# Patient Record
Sex: FEMALE | Race: WHITE | Employment: OTHER | URBAN - METROPOLITAN AREA
[De-identification: names, ages, dates, MRNs, and addresses within clinical notes are randomized per-mention and may not be internally consistent; named-entity substitution may affect disease eponyms.]

---

## 2017-03-10 ENCOUNTER — APPOINTMENT (OUTPATIENT)
Dept: CT IMAGING | Facility: HOSPITAL | Age: 67
End: 2017-03-10
Attending: EMERGENCY MEDICINE
Payer: MEDICARE

## 2017-03-10 ENCOUNTER — HOSPITAL ENCOUNTER (EMERGENCY)
Facility: HOSPITAL | Age: 67
Discharge: HOME | End: 2017-03-10
Attending: EMERGENCY MEDICINE
Payer: MEDICARE

## 2017-03-10 VITALS
HEIGHT: 61 IN | DIASTOLIC BLOOD PRESSURE: 90 MMHG | BODY MASS INDEX: 52.87 KG/M2 | WEIGHT: 280 LBS | OXYGEN SATURATION: 98 % | HEART RATE: 78 BPM | SYSTOLIC BLOOD PRESSURE: 135 MMHG | TEMPERATURE: 99 F | RESPIRATION RATE: 22 BRPM

## 2017-03-10 DIAGNOSIS — S20.212A CHEST WALL CONTUSION, LEFT, INITIAL ENCOUNTER: Primary | ICD-10-CM

## 2017-03-10 LAB
ALBUMIN SERPL-MCNC: 4 G/DL (ref 3.5–4.8)
ALP LIVER SERPL-CCNC: 70 U/L (ref 55–142)
ALT SERPL-CCNC: 37 U/L (ref 14–54)
AST SERPL-CCNC: 27 U/L (ref 15–41)
BASOPHILS # BLD AUTO: 0.04 X10(3) UL (ref 0–0.1)
BASOPHILS NFR BLD AUTO: 0.5 %
BILIRUB SERPL-MCNC: 0.4 MG/DL (ref 0.1–2)
BUN BLD-MCNC: 12 MG/DL (ref 8–20)
CALCIUM BLD-MCNC: 9.5 MG/DL (ref 8.3–10.3)
CHLORIDE: 98 MMOL/L (ref 101–111)
CO2: 27 MMOL/L (ref 22–32)
CREAT BLD-MCNC: 0.93 MG/DL (ref 0.55–1.02)
EOSINOPHIL # BLD AUTO: 0.15 X10(3) UL (ref 0–0.3)
EOSINOPHIL NFR BLD AUTO: 1.8 %
ERYTHROCYTE [DISTWIDTH] IN BLOOD BY AUTOMATED COUNT: 13.4 % (ref 11.5–16)
GLUCOSE BLD-MCNC: 131 MG/DL (ref 70–99)
HCT VFR BLD AUTO: 36.2 % (ref 34–50)
HGB BLD-MCNC: 12.6 G/DL (ref 12–16)
IMMATURE GRANULOCYTE COUNT: 0.05 X10(3) UL (ref 0–1)
IMMATURE GRANULOCYTE RATIO %: 0.6 %
LYMPHOCYTES # BLD AUTO: 1.68 X10(3) UL (ref 0.9–4)
LYMPHOCYTES NFR BLD AUTO: 20.3 %
M PROTEIN MFR SERPL ELPH: 7.6 G/DL (ref 6.1–8.3)
MCH RBC QN AUTO: 29.9 PG (ref 27–33.2)
MCHC RBC AUTO-ENTMCNC: 34.8 G/DL (ref 31–37)
MCV RBC AUTO: 85.8 FL (ref 81–100)
MONOCYTES # BLD AUTO: 0.33 X10(3) UL (ref 0.1–0.6)
MONOCYTES NFR BLD AUTO: 4 %
NEUTROPHIL ABS PRELIM: 6.01 X10 (3) UL (ref 1.3–6.7)
NEUTROPHILS # BLD AUTO: 6.01 X10(3) UL (ref 1.3–6.7)
NEUTROPHILS NFR BLD AUTO: 72.8 %
PLATELET # BLD AUTO: 247 10(3)UL (ref 150–450)
POTASSIUM SERPL-SCNC: 4.5 MMOL/L (ref 3.6–5.1)
RBC # BLD AUTO: 4.22 X10(6)UL (ref 3.8–5.1)
RED CELL DISTRIBUTION WIDTH-SD: 42.1 FL (ref 35.1–46.3)
SODIUM SERPL-SCNC: 135 MMOL/L (ref 136–144)
WBC # BLD AUTO: 8.3 X10(3) UL (ref 4–13)

## 2017-03-10 PROCEDURE — 99284 EMERGENCY DEPT VISIT MOD MDM: CPT

## 2017-03-10 PROCEDURE — 36415 COLL VENOUS BLD VENIPUNCTURE: CPT

## 2017-03-10 PROCEDURE — 71250 CT THORAX DX C-: CPT

## 2017-03-10 PROCEDURE — 80053 COMPREHEN METABOLIC PANEL: CPT | Performed by: EMERGENCY MEDICINE

## 2017-03-10 PROCEDURE — 85025 COMPLETE CBC W/AUTO DIFF WBC: CPT | Performed by: EMERGENCY MEDICINE

## 2017-03-10 NOTE — ED INITIAL ASSESSMENT (HPI)
[place] [facility] [place] [facility] [place] a large left pleural effusion. Pt c/o SOB. Pt reports yesterday while traveling home from [place], pt missed a step and fell on her left side. Pt states he left side hurts denies loc.

## 2017-03-11 NOTE — ED PROVIDER NOTES
Patient Seen in: [facility] Emergency Department    History   Patient presents with:  Dyspnea DIPTI SOB (respiratory)    Stated Complaint:     HPI    66-year-old white female who presents the emergency room today for complaint of fall rib pain.   Pro escitalopram 10 MG Oral Tab,  Take 1 tablet (10 mg total) by mouth daily. Acetaminophen-Codeine (TYLENOL WITH CODEINE #3) 300-30 MG Oral Tab,  Take 1 tablet by mouth every 4 (four) hours as needed for Pain.    Cyclobenzaprine HCl (FLEXERIL) 10 MG Oral Tab Oral Tab,  1 TABLET DAILY- 2MG BID       No family history on file.       Smoking Status: Current Every Day Smoker        Packs/Day: 1.20  Years: 40        Types: Cigarettes    Smokeless Status: Never Used                        Alcohol Use: Yes           0 Abnormal; Notable for the following:     Glucose 131 (*)     Sodium 135 (*)     Chloride 98 (*)     All other components within normal limits   CBC WITH DIFFERENTIAL WITH PLATELET    Narrative:      The following orders were created for panel order CBC WITH lobe.     3. Findings possibly related to pulmonary arterial hypertension. Clinical correlation recommended.     4. Diffuse fatty infiltration of the liver.     5. Coronary artery calcifications.          MDM   Patient appears to have a chest wall contusion

## 2017-03-16 PROCEDURE — 99284 EMERGENCY DEPT VISIT MOD MDM: CPT

## 2017-03-16 PROCEDURE — 96372 THER/PROPH/DIAG INJ SC/IM: CPT

## 2017-03-17 ENCOUNTER — APPOINTMENT (OUTPATIENT)
Dept: GENERAL RADIOLOGY | Facility: HOSPITAL | Age: 67
End: 2017-03-17
Payer: MEDICARE

## 2017-03-17 ENCOUNTER — APPOINTMENT (OUTPATIENT)
Dept: CT IMAGING | Facility: HOSPITAL | Age: 67
End: 2017-03-17
Payer: MEDICARE

## 2017-03-17 ENCOUNTER — HOSPITAL ENCOUNTER (EMERGENCY)
Facility: HOSPITAL | Age: 67
Discharge: HOME | End: 2017-03-17
Payer: MEDICARE

## 2017-03-17 VITALS
HEART RATE: 75 BPM | BODY MASS INDEX: 53 KG/M2 | WEIGHT: 280 LBS | TEMPERATURE: 97 F | OXYGEN SATURATION: 93 % | SYSTOLIC BLOOD PRESSURE: 170 MMHG | DIASTOLIC BLOOD PRESSURE: 80 MMHG | RESPIRATION RATE: 18 BRPM

## 2017-03-17 DIAGNOSIS — S22.32XA CLOSED FRACTURE OF ONE RIB OF LEFT SIDE, INITIAL ENCOUNTER: Primary | ICD-10-CM

## 2017-03-17 DIAGNOSIS — Z72.0 TOBACCO ABUSE: ICD-10-CM

## 2017-03-17 DIAGNOSIS — E66.01 MORBID OBESITY, UNSPECIFIED OBESITY TYPE (HCC): ICD-10-CM

## 2017-03-17 PROCEDURE — 74150 CT ABDOMEN W/O CONTRAST: CPT

## 2017-03-17 PROCEDURE — 71250 CT THORAX DX C-: CPT

## 2017-03-17 RX ORDER — HYDROMORPHONE HYDROCHLORIDE 1 MG/ML
1 INJECTION, SOLUTION INTRAMUSCULAR; INTRAVENOUS; SUBCUTANEOUS ONCE
Status: COMPLETED | OUTPATIENT
Start: 2017-03-17 | End: 2017-03-17

## 2017-03-17 RX ORDER — ACETAMINOPHEN AND CODEINE PHOSPHATE 300; 30 MG/1; MG/1
1 TABLET ORAL ONCE
Status: COMPLETED | OUTPATIENT
Start: 2017-03-17 | End: 2017-03-17

## 2017-03-17 RX ORDER — ONDANSETRON 4 MG/1
4 TABLET, ORALLY DISINTEGRATING ORAL ONCE
Status: COMPLETED | OUTPATIENT
Start: 2017-03-17 | End: 2017-03-17

## 2017-03-17 NOTE — ED INITIAL ASSESSMENT (HPI)
Presents with left rib cage pain. Pt had a fall last Friday and was seen in this ED. CT imaging completed.  Pain increased over last 2 days, taking Tramadol at home without relief

## 2017-03-17 NOTE — ED PROVIDER NOTES
Patient Seen in: [facility] Emergency Department    History   Patient presents with:  Pain (neurologic)    Stated Complaint: pain    HPI    Patient is a 71-year-old morbidly obese chronic smoker with Crohn's diverticulitis and arthritis presenting t • Arthritis    • Anemia      Borderline   • HIGH BLOOD PRESSURE    • HIGH CHOLESTEROL    • STROKE 1994      TIA   • Liver disease      fatty liver   • Esophageal reflux    • PONV (postoperative nausea and vomiting)    • Anxiety    • TIA (transient ischem DIPHENOXYLATE-ATROPINE 2.5-0.025 MG Oral Tab,  TAKE 1 TABLET BY MOUTH THREE TIMES DAILY AS NEEDED   Ondansetron HCl (ZOFRAN) 8 MG Oral Tab,  Take 1 tablet by mouth every 8 (eight) hours as needed for Nausea.    Acidophilus/Pectin (PROBIOTIC) Oral Cap,  Take 127 kg  SpO2 95%        Physical Exam    GENERAL APPEARANCE:     Well developed, well nourished, alert     Morbidly obese female in no apparent distress, sitting at the side of the bed, her  at the bedside. Head: Normocephalic and atraumatic.      Mariana Hussein with hypertension. No mediastinal hematoma. No pleural effusion, pneumothorax or pneumomediastinum. The lungs demonstrate minimal dependent atelectasis. ABDOMEN:    No free intraperitoneal fluid or gas. The pelvis was not imaged.   Nondisplaced left late repeat exam, workup as needed, for pain management      Medications Prescribed:  Current Discharge Medication List

## 2017-03-17 NOTE — ED NOTES
Pt instructed on incentive spirometer use, Pt with good return demonstration and verbal understanding

## 2017-06-07 ENCOUNTER — OFFICE VISIT (OUTPATIENT)
Dept: RHEUMATOLOGY | Facility: CLINIC | Age: 67
End: 2017-06-07

## 2017-06-07 VITALS
BODY MASS INDEX: 51.99 KG/M2 | HEIGHT: 61 IN | RESPIRATION RATE: 22 BRPM | WEIGHT: 275.38 LBS | DIASTOLIC BLOOD PRESSURE: 72 MMHG | HEART RATE: 88 BPM | SYSTOLIC BLOOD PRESSURE: 136 MMHG

## 2017-06-07 DIAGNOSIS — M25.511 CHRONIC PAIN OF BOTH SHOULDERS: ICD-10-CM

## 2017-06-07 DIAGNOSIS — K63.9 ARTHRITIS ASSOCIATED WITH INFLAMMATORY BOWEL DISEASE: ICD-10-CM

## 2017-06-07 DIAGNOSIS — M75.51 BURSITIS OF RIGHT SHOULDER: ICD-10-CM

## 2017-06-07 DIAGNOSIS — G89.29 CHRONIC PAIN OF BOTH SHOULDERS: ICD-10-CM

## 2017-06-07 DIAGNOSIS — K50.10 CROHN'S COLITIS, WITHOUT COMPLICATIONS (HCC): Primary | ICD-10-CM

## 2017-06-07 DIAGNOSIS — M25.511 RIGHT SHOULDER PAIN, UNSPECIFIED CHRONICITY: ICD-10-CM

## 2017-06-07 DIAGNOSIS — M25.512 CHRONIC PAIN OF BOTH SHOULDERS: ICD-10-CM

## 2017-06-07 DIAGNOSIS — M07.60 ARTHRITIS ASSOCIATED WITH INFLAMMATORY BOWEL DISEASE: ICD-10-CM

## 2017-06-07 PROCEDURE — 99213 OFFICE O/P EST LOW 20 MIN: CPT | Performed by: INTERNAL MEDICINE

## 2017-06-07 PROCEDURE — 20610 DRAIN/INJ JOINT/BURSA W/O US: CPT | Performed by: INTERNAL MEDICINE

## 2017-06-07 RX ORDER — ACETAMINOPHEN AND CODEINE PHOSPHATE 300; 30 MG/1; MG/1
1 TABLET ORAL EVERY 4 HOURS PRN
Qty: 100 TABLET | Refills: 1 | Status: SHIPPED | OUTPATIENT
Start: 2017-06-07 | End: 2017-11-01

## 2017-06-07 RX ORDER — METHYLPREDNISOLONE ACETATE 40 MG/ML
40 INJECTION, SUSPENSION INTRA-ARTICULAR; INTRALESIONAL; INTRAMUSCULAR; SOFT TISSUE ONCE
Status: COMPLETED | OUTPATIENT
Start: 2017-06-07 | End: 2017-06-07

## 2017-06-07 RX ORDER — TRAMADOL HYDROCHLORIDE 50 MG/1
50 TABLET ORAL EVERY 6 HOURS PRN
Qty: 100 TABLET | Refills: 2 | Status: SHIPPED | OUTPATIENT
Start: 2017-06-07 | End: 2017-11-01

## 2017-06-07 RX ORDER — CYCLOBENZAPRINE HCL 10 MG
10 TABLET ORAL 3 TIMES DAILY PRN
Qty: 90 TABLET | Refills: 2 | Status: SHIPPED | OUTPATIENT
Start: 2017-06-07 | End: 2017-11-01

## 2017-06-07 RX ORDER — LIDOCAINE 50 MG/G
2 PATCH TOPICAL EVERY 24 HOURS
Qty: 60 PATCH | Refills: 3 | Status: SHIPPED | OUTPATIENT
Start: 2017-06-07 | End: 2017-11-01

## 2017-06-07 RX ORDER — PREDNISONE 1 MG/1
5 TABLET ORAL DAILY
Qty: 90 TABLET | Refills: 3 | Status: SHIPPED | OUTPATIENT
Start: 2017-06-07 | End: 2017-11-01

## 2017-06-07 RX ADMIN — METHYLPREDNISOLONE ACETATE 40 MG: 40 INJECTION, SUSPENSION INTRA-ARTICULAR; INTRALESIONAL; INTRAMUSCULAR; SOFT TISSUE at 09:42:00

## 2017-06-07 NOTE — PROCEDURES
After obtaining consent from the patient, the right shoulder was cleansed with betadine and alcohol wipes. Then the right shoulder was injected with 40 mg of redness alone and 1 cc 1% lidocaine. Patient tolerated the injection well.

## 2017-06-07 NOTE — PROGRESS NOTES
[facility]  Dr. Latoya Lovell Progress Note     Subjective:   Gwen Sepulveda is a(n) 79year old female.    Current complaints: Patient presents with:  Joint Pain: pt is here for a 6 month f/up- pt c/o pain 5/10 her left hand and right shoulders  Yesterday f Derm patch. For pain during the day take tramadol 50 mg 1 or 2 at a time as needed. At nighttime if there is severe pain take Tylenol 3. Continue your Humira and Asacol for your Crohn's disease. Return to see Dr. Alisha Argueta in 6 months.      See procedure n

## 2017-06-07 NOTE — PATIENT INSTRUCTIONS
Current instructions are to use prednisone 5 mg once a day in most cases.   However when there is severe aches and pains or flareup of the arthritis, then take extra prednisone sometimes she will need to tablets which which would be 10 mg and on rare occasi

## 2017-11-01 ENCOUNTER — OFFICE VISIT (OUTPATIENT)
Dept: RHEUMATOLOGY | Facility: CLINIC | Age: 67
End: 2017-11-01

## 2017-11-01 VITALS
WEIGHT: 270 LBS | RESPIRATION RATE: 24 BRPM | HEART RATE: 68 BPM | SYSTOLIC BLOOD PRESSURE: 116 MMHG | BODY MASS INDEX: 51 KG/M2 | DIASTOLIC BLOOD PRESSURE: 78 MMHG

## 2017-11-01 DIAGNOSIS — IMO0001 CLASS 3 OBESITY DUE TO EXCESS CALORIES WITHOUT SERIOUS COMORBIDITY WITH BODY MASS INDEX (BMI) OF 50.0 TO 59.9 IN ADULT: ICD-10-CM

## 2017-11-01 DIAGNOSIS — K63.9 ARTHRITIS ASSOCIATED WITH INFLAMMATORY BOWEL DISEASE: Primary | ICD-10-CM

## 2017-11-01 DIAGNOSIS — M07.60 ARTHRITIS ASSOCIATED WITH INFLAMMATORY BOWEL DISEASE: Primary | ICD-10-CM

## 2017-11-01 PROCEDURE — 99213 OFFICE O/P EST LOW 20 MIN: CPT | Performed by: INTERNAL MEDICINE

## 2017-11-01 RX ORDER — ACETAMINOPHEN AND CODEINE PHOSPHATE 300; 30 MG/1; MG/1
1 TABLET ORAL EVERY 4 HOURS PRN
Qty: 100 TABLET | Refills: 3 | Status: SHIPPED | OUTPATIENT
Start: 2017-11-01 | End: 2018-05-10

## 2017-11-01 RX ORDER — PREDNISONE 1 MG/1
5 TABLET ORAL DAILY
Qty: 100 TABLET | Refills: 3 | Status: SHIPPED | OUTPATIENT
Start: 2017-11-01 | End: 2018-05-10

## 2017-11-01 RX ORDER — TRAMADOL HYDROCHLORIDE 50 MG/1
50 TABLET ORAL EVERY 6 HOURS PRN
Qty: 100 TABLET | Refills: 5 | Status: SHIPPED | OUTPATIENT
Start: 2017-11-01 | End: 2018-05-10

## 2017-11-01 RX ORDER — LIDOCAINE 50 MG/G
2 PATCH TOPICAL EVERY 24 HOURS
Qty: 60 PATCH | Refills: 3 | Status: SHIPPED | OUTPATIENT
Start: 2017-11-01 | End: 2019-05-08

## 2017-11-01 RX ORDER — CYCLOBENZAPRINE HCL 10 MG
10 TABLET ORAL 3 TIMES DAILY PRN
Qty: 90 TABLET | Refills: 2 | Status: SHIPPED | OUTPATIENT
Start: 2017-11-01 | End: 2018-01-30

## 2017-11-01 NOTE — PROGRESS NOTES
[facility]  Dr. Lupillo Barrera Progress Note     Subjective:   Marjan Galvan is a(n) 79year old female. Current complaints: Patient presents with:  Arthritis: 6 month f/u.  Pt states 'right knuckles and hand swell up and is having a hard time making a fis

## 2017-11-01 NOTE — PATIENT INSTRUCTIONS
Current plan is to take prednisone generally 5 mg once a day but on bad days you can take a second tablet. As far as pain medicine use tramadol 50 mg generally 2 per day. At nighttime if there is severe pain take Tylenol 3.   Continue on your Humira and A

## 2018-01-30 RX ORDER — CYCLOBENZAPRINE HCL 10 MG
TABLET ORAL
Qty: 90 TABLET | Refills: 0 | Status: SHIPPED | OUTPATIENT
Start: 2018-01-30 | End: 2018-05-10

## 2018-01-30 NOTE — TELEPHONE ENCOUNTER
LOV 11/1/17  Future Appointments  Date Time Provider Vicenta Burnett   8/98/2033 42:72 AM Asha Paulino MD [facility] Cheri Campbell

## 2018-05-02 ENCOUNTER — TELEPHONE (OUTPATIENT)
Dept: RHEUMATOLOGY | Facility: CLINIC | Age: 68
End: 2018-05-02

## 2018-05-02 DIAGNOSIS — K63.9 ARTHROPATHY ASSOCIATED WITH GASTROINTESTINAL CONDITION: Primary | ICD-10-CM

## 2018-05-02 DIAGNOSIS — K63.9 DISEASE OF INTESTINE: ICD-10-CM

## 2018-05-02 DIAGNOSIS — M07.60 ARTHROPATHY ASSOCIATED WITH GASTROINTESTINAL CONDITION: Primary | ICD-10-CM

## 2018-05-10 ENCOUNTER — OFFICE VISIT (OUTPATIENT)
Dept: RHEUMATOLOGY | Facility: CLINIC | Age: 68
End: 2018-05-10

## 2018-05-10 VITALS
BODY MASS INDEX: 48 KG/M2 | RESPIRATION RATE: 24 BRPM | WEIGHT: 259.81 LBS | SYSTOLIC BLOOD PRESSURE: 118 MMHG | HEART RATE: 84 BPM | DIASTOLIC BLOOD PRESSURE: 78 MMHG

## 2018-05-10 DIAGNOSIS — K63.9 ARTHRITIS ASSOCIATED WITH INFLAMMATORY BOWEL DISEASE: Primary | ICD-10-CM

## 2018-05-10 DIAGNOSIS — M07.60 ARTHRITIS ASSOCIATED WITH INFLAMMATORY BOWEL DISEASE: Primary | ICD-10-CM

## 2018-05-10 DIAGNOSIS — M25.512 CHRONIC PAIN OF BOTH SHOULDERS: ICD-10-CM

## 2018-05-10 DIAGNOSIS — G89.29 CHRONIC PAIN OF BOTH SHOULDERS: ICD-10-CM

## 2018-05-10 DIAGNOSIS — E66.01 CLASS 3 SEVERE OBESITY DUE TO EXCESS CALORIES WITHOUT SERIOUS COMORBIDITY WITH BODY MASS INDEX (BMI) OF 50.0 TO 59.9 IN ADULT (HCC): ICD-10-CM

## 2018-05-10 DIAGNOSIS — K50.10 CROHN'S DISEASE OF COLON WITHOUT COMPLICATION (HCC): ICD-10-CM

## 2018-05-10 DIAGNOSIS — M25.511 CHRONIC PAIN OF BOTH SHOULDERS: ICD-10-CM

## 2018-05-10 PROCEDURE — 99213 OFFICE O/P EST LOW 20 MIN: CPT | Performed by: INTERNAL MEDICINE

## 2018-05-10 RX ORDER — ACETAMINOPHEN AND CODEINE PHOSPHATE 300; 30 MG/1; MG/1
1 TABLET ORAL EVERY 4 HOURS PRN
Qty: 100 TABLET | Refills: 3 | Status: SHIPPED | OUTPATIENT
Start: 2018-05-10 | End: 2018-11-07

## 2018-05-10 RX ORDER — TRAMADOL HYDROCHLORIDE 50 MG/1
50 TABLET ORAL EVERY 6 HOURS PRN
Qty: 100 TABLET | Refills: 5 | Status: SHIPPED | OUTPATIENT
Start: 2018-05-10 | End: 2018-11-07

## 2018-05-10 RX ORDER — CYCLOBENZAPRINE HCL 10 MG
TABLET ORAL
Qty: 90 TABLET | Refills: 1 | Status: SHIPPED | OUTPATIENT
Start: 2018-05-10 | End: 2018-11-07

## 2018-05-10 RX ORDER — PREDNISONE 1 MG/1
5 TABLET ORAL DAILY
Qty: 100 TABLET | Refills: 3 | Status: SHIPPED | OUTPATIENT
Start: 2018-05-10 | End: 2018-11-07

## 2018-05-10 NOTE — PROGRESS NOTES
[facility]  Dr. Nestor Medellin Progress Note     Subjective:   Sourav Neri is a(n) 76year old female. Current complaints: Patient presents with:  Arthritis: 6 month f/u. Pt states 'has a bump right index finger, bump will go on to different fingers. ' 6.5.  Continue to take vitamin B12 supplement because your recent vitamin B12 level while normal was in the lower range of normal.  Return to see Dr. Judah Ramos in 6 months.        Basim Lewis MD 3/21/9611 10:45 AM

## 2018-05-10 NOTE — PATIENT INSTRUCTIONS
Current plan is to continue on prednisone 5 mg once a day for inflammatory arthritis. Continue on your Humira and Asacol for your Crohn's disease and see Dr. Darleen Musa of GI with on a regular basis. Tylenol 3 1 or 2 pills at night for sleep.   During the

## 2018-11-07 ENCOUNTER — OFFICE VISIT (OUTPATIENT)
Dept: RHEUMATOLOGY | Facility: CLINIC | Age: 68
End: 2018-11-07
Payer: MEDICARE

## 2018-11-07 VITALS
SYSTOLIC BLOOD PRESSURE: 118 MMHG | HEIGHT: 63 IN | BODY MASS INDEX: 47.48 KG/M2 | DIASTOLIC BLOOD PRESSURE: 72 MMHG | WEIGHT: 268 LBS | RESPIRATION RATE: 20 BRPM | HEART RATE: 72 BPM

## 2018-11-07 DIAGNOSIS — M25.512 CHRONIC PAIN OF BOTH SHOULDERS: ICD-10-CM

## 2018-11-07 DIAGNOSIS — M07.60 ARTHRITIS ASSOCIATED WITH INFLAMMATORY BOWEL DISEASE: Primary | ICD-10-CM

## 2018-11-07 DIAGNOSIS — K63.9 ARTHRITIS ASSOCIATED WITH INFLAMMATORY BOWEL DISEASE: Primary | ICD-10-CM

## 2018-11-07 DIAGNOSIS — K50.10 CROHN'S DISEASE OF COLON WITHOUT COMPLICATION (HCC): ICD-10-CM

## 2018-11-07 DIAGNOSIS — M25.511 CHRONIC PAIN OF BOTH SHOULDERS: ICD-10-CM

## 2018-11-07 DIAGNOSIS — G89.29 CHRONIC PAIN OF BOTH SHOULDERS: ICD-10-CM

## 2018-11-07 DIAGNOSIS — E66.01 CLASS 3 SEVERE OBESITY DUE TO EXCESS CALORIES WITHOUT SERIOUS COMORBIDITY WITH BODY MASS INDEX (BMI) OF 50.0 TO 59.9 IN ADULT (HCC): ICD-10-CM

## 2018-11-07 PROCEDURE — 20610 DRAIN/INJ JOINT/BURSA W/O US: CPT | Performed by: INTERNAL MEDICINE

## 2018-11-07 RX ORDER — ACETAMINOPHEN AND CODEINE PHOSPHATE 300; 30 MG/1; MG/1
1 TABLET ORAL EVERY 6 HOURS PRN
Qty: 100 TABLET | Refills: 3 | Status: SHIPPED | OUTPATIENT
Start: 2018-11-07 | End: 2019-05-08

## 2018-11-07 RX ORDER — CYCLOBENZAPRINE HCL 10 MG
TABLET ORAL
Qty: 90 TABLET | Refills: 2 | Status: SHIPPED | OUTPATIENT
Start: 2018-11-07 | End: 2019-05-08

## 2018-11-07 RX ORDER — TRAMADOL HYDROCHLORIDE 50 MG/1
50 TABLET ORAL EVERY 6 HOURS PRN
Qty: 100 TABLET | Refills: 5 | Status: SHIPPED | OUTPATIENT
Start: 2018-11-07 | End: 2019-05-08

## 2018-11-07 RX ORDER — METHYLPREDNISOLONE ACETATE 40 MG/ML
40 INJECTION, SUSPENSION INTRA-ARTICULAR; INTRALESIONAL; INTRAMUSCULAR; SOFT TISSUE ONCE
Status: COMPLETED | OUTPATIENT
Start: 2018-11-07 | End: 2018-11-07

## 2018-11-07 RX ORDER — PREDNISONE 1 MG/1
5 TABLET ORAL DAILY
Qty: 100 TABLET | Refills: 3 | Status: SHIPPED | OUTPATIENT
Start: 2018-11-07 | End: 2019-05-08

## 2018-11-07 RX ADMIN — METHYLPREDNISOLONE ACETATE 40 MG: 40 INJECTION, SUSPENSION INTRA-ARTICULAR; INTRALESIONAL; INTRAMUSCULAR; SOFT TISSUE at 13:34:00

## 2018-11-07 RX ADMIN — METHYLPREDNISOLONE ACETATE 40 MG: 40 INJECTION, SUSPENSION INTRA-ARTICULAR; INTRALESIONAL; INTRAMUSCULAR; SOFT TISSUE at 13:33:00

## 2018-11-07 NOTE — PROGRESS NOTES
[facility]  Dr. Charles De Leon Progress Note     Subjective:   Cesar Wild is a(n) 76year old female. Current complaints: Patient presents with:  Osteoarthritis: 6 month follow-uo, has been struggling with joint pain, back, hands and toes.  No recent la

## 2018-11-07 NOTE — PROCEDURES
After obtaining consent from the patient, both shoulders were cleaned with a combination of Betadine and alcohol wipes. Then from the anterior approach both shoulders were injected.   40 mg of methylprednisolone and 1 cc 1% lidocaine was injected in each

## 2018-11-07 NOTE — PATIENT INSTRUCTIONS
Plan as far as treatment is continue on prednisone 5 mg once a day for inflammatory arthritis. Continue your treatment with Dr. Merry Pathak of GI for your Crohn's disease which includes Humira and Asacol.   During the day take tramadol 50 mg 1 or 2 as needed f

## 2019-05-08 ENCOUNTER — OFFICE VISIT (OUTPATIENT)
Dept: RHEUMATOLOGY | Facility: CLINIC | Age: 69
End: 2019-05-08
Payer: MEDICARE

## 2019-05-08 VITALS
WEIGHT: 264 LBS | DIASTOLIC BLOOD PRESSURE: 68 MMHG | HEART RATE: 80 BPM | SYSTOLIC BLOOD PRESSURE: 158 MMHG | HEIGHT: 62 IN | BODY MASS INDEX: 48.58 KG/M2

## 2019-05-08 DIAGNOSIS — K50.10 CROHN'S DISEASE OF COLON WITHOUT COMPLICATION (HCC): ICD-10-CM

## 2019-05-08 DIAGNOSIS — K63.9 ARTHRITIS ASSOCIATED WITH INFLAMMATORY BOWEL DISEASE: Primary | ICD-10-CM

## 2019-05-08 DIAGNOSIS — M25.511 CHRONIC PAIN OF BOTH SHOULDERS: ICD-10-CM

## 2019-05-08 DIAGNOSIS — G89.29 CHRONIC PAIN OF BOTH SHOULDERS: ICD-10-CM

## 2019-05-08 DIAGNOSIS — M25.512 CHRONIC PAIN OF BOTH SHOULDERS: ICD-10-CM

## 2019-05-08 DIAGNOSIS — E66.01 CLASS 3 SEVERE OBESITY DUE TO EXCESS CALORIES WITHOUT SERIOUS COMORBIDITY WITH BODY MASS INDEX (BMI) OF 50.0 TO 59.9 IN ADULT (HCC): ICD-10-CM

## 2019-05-08 DIAGNOSIS — M07.60 ARTHRITIS ASSOCIATED WITH INFLAMMATORY BOWEL DISEASE: Primary | ICD-10-CM

## 2019-05-08 PROCEDURE — 99213 OFFICE O/P EST LOW 20 MIN: CPT | Performed by: INTERNAL MEDICINE

## 2019-05-08 RX ORDER — CYCLOBENZAPRINE HCL 10 MG
TABLET ORAL
Qty: 90 TABLET | Refills: 2 | Status: SHIPPED | OUTPATIENT
Start: 2019-05-08 | End: 2020-07-01

## 2019-05-08 RX ORDER — LIDOCAINE 50 MG/G
2 PATCH TOPICAL EVERY 24 HOURS
Qty: 60 PATCH | Refills: 3 | Status: SHIPPED | OUTPATIENT
Start: 2019-05-08 | End: 2020-07-01

## 2019-05-08 RX ORDER — TRAMADOL HYDROCHLORIDE 50 MG/1
TABLET ORAL EVERY 6 HOURS PRN
Qty: 100 TABLET | Refills: 5 | Status: SHIPPED | OUTPATIENT
Start: 2019-05-08 | End: 2019-10-30

## 2019-05-08 RX ORDER — PREDNISONE 1 MG/1
5 TABLET ORAL 2 TIMES DAILY PRN
Qty: 120 TABLET | Refills: 3 | Status: SHIPPED | OUTPATIENT
Start: 2019-05-08 | End: 2019-10-30

## 2019-05-08 RX ORDER — ACETAMINOPHEN AND CODEINE PHOSPHATE 300; 30 MG/1; MG/1
1 TABLET ORAL EVERY 6 HOURS PRN
Qty: 100 TABLET | Refills: 3 | Status: SHIPPED | OUTPATIENT
Start: 2019-05-08 | End: 2019-10-30

## 2019-05-08 NOTE — PROGRESS NOTES
[facility]  Dr. Linnea Pedraza Progress Note     Subjective:   Piter Bustos is a(n) 71year old female. Current complaints: Patient presents with: Follow - Up: 6 adilia f/u, Arthritis asociated with inflammatory bowel disease, Pain contract 11/12/18.  No r

## 2019-05-08 NOTE — PATIENT INSTRUCTIONS
Use Prednisone 5 mg per day for arthritis pain, occasionally a rare 2nd pill. Use Tramadol for pain 50 mg 1-2 at a time. For pain use Tylenol#3, 1-2 as needed for pain up to 2 per day. Use Humira for Crohn's.   See Dr Martine Melendez of Orthopedics regarding yo

## 2019-07-03 ENCOUNTER — HOSPITAL ENCOUNTER (OUTPATIENT)
Facility: HOSPITAL | Age: 69
Setting detail: HOSPITAL OUTPATIENT SURGERY
Discharge: HOME | End: 2019-07-03
Attending: INTERNAL MEDICINE | Admitting: INTERNAL MEDICINE
Payer: MEDICARE

## 2019-07-03 ENCOUNTER — ANESTHESIA EVENT (OUTPATIENT)
Dept: ENDOSCOPY | Facility: HOSPITAL | Age: 69
End: 2019-07-03
Payer: MEDICARE

## 2019-07-03 ENCOUNTER — ANESTHESIA (OUTPATIENT)
Dept: ENDOSCOPY | Facility: HOSPITAL | Age: 69
End: 2019-07-03
Payer: MEDICARE

## 2019-07-03 VITALS
BODY MASS INDEX: 48.21 KG/M2 | RESPIRATION RATE: 18 BRPM | WEIGHT: 262 LBS | OXYGEN SATURATION: 98 % | SYSTOLIC BLOOD PRESSURE: 126 MMHG | HEART RATE: 68 BPM | DIASTOLIC BLOOD PRESSURE: 61 MMHG | HEIGHT: 62 IN | TEMPERATURE: 98 F

## 2019-07-03 DIAGNOSIS — K50.10 CROHN'S DISEASE OF COLON WITHOUT COMPLICATION (HCC): ICD-10-CM

## 2019-07-03 LAB — GLUCOSE BLD-MCNC: 176 MG/DL (ref 70–99)

## 2019-07-03 PROCEDURE — 0DJD8ZZ INSPECTION OF LOWER INTESTINAL TRACT, VIA NATURAL OR ARTIFICIAL OPENING ENDOSCOPIC: ICD-10-PCS | Performed by: INTERNAL MEDICINE

## 2019-07-03 PROCEDURE — 82962 GLUCOSE BLOOD TEST: CPT

## 2019-07-03 PROCEDURE — 88305 TISSUE EXAM BY PATHOLOGIST: CPT | Performed by: INTERNAL MEDICINE

## 2019-07-03 RX ORDER — DEXTROSE MONOHYDRATE 25 G/50ML
50 INJECTION, SOLUTION INTRAVENOUS
Status: DISCONTINUED | OUTPATIENT
Start: 2019-07-03 | End: 2019-07-03

## 2019-07-03 RX ORDER — NALOXONE HYDROCHLORIDE 0.4 MG/ML
80 INJECTION, SOLUTION INTRAMUSCULAR; INTRAVENOUS; SUBCUTANEOUS AS NEEDED
Status: DISCONTINUED | OUTPATIENT
Start: 2019-07-03 | End: 2019-07-03

## 2019-07-03 RX ORDER — SODIUM CHLORIDE, SODIUM LACTATE, POTASSIUM CHLORIDE, CALCIUM CHLORIDE 600; 310; 30; 20 MG/100ML; MG/100ML; MG/100ML; MG/100ML
INJECTION, SOLUTION INTRAVENOUS CONTINUOUS
Status: DISCONTINUED | OUTPATIENT
Start: 2019-07-03 | End: 2019-07-03

## 2019-07-03 NOTE — OPERATIVE REPORT
[facility]    PATIENT'S NAME: Ira Montague   ATTENDING PHYSICIAN: Aiden Heaton M.D. OPERATING PHYSICIAN: Aiden Heaton M.D.    PATIENT ACCOUNT#:   [de-identified]    LOCATION:  [facility] ROOMS 9 EDWP  MEDICAL RECORD #:   [id number]

## 2019-07-03 NOTE — ANESTHESIA PREPROCEDURE EVALUATION
PRE-OP EVALUATION    Patient Name: Haley Iniguez    Pre-op Diagnosis: Crohn's disease of colon without complication ([facility].) [T08.12]    Procedure(s):  COLONOSCOPY with cold forcep biopsies    Surgeon(s) and Role:     * To Martínez MD - Primary    Pre Pen-injector Kit Inject 40 mg into the skin every 14 (fourteen) days.  CITRATE FREE Schedule annual MD visit before next refill request. Disp: 2 each Rfl: 7   Dexlansoprazole (DEXILANT) 60 MG Oral Capsule Delayed Release TAKE 1 CAPSULE ONCE DAILY Disp: 90 c (DAILY VITAMIN OR) None Entered Disp:  Rfl:        Allergies: Metrizamide; Bactrim [Sulfamethoxazole W/Trimethoprim]; Bupropion; Cephalosporins; Ciprofloxacin; Clarithromycin [Clarithromycin]; Keflex [Cephalexin]; Levaquin [Levofloxacin]; Penicillins;  Radi and patient.       Plan/risks discussed with: patient                Present on Admission:  **None**

## 2019-07-03 NOTE — ANESTHESIA POSTPROCEDURE EVALUATION
[address] Patient Status:  Hospital Outpatient Surgery   Age/Gender 71year old female MRN [id number]   Location [facility]. Attending Martine Bowles MD   Hosp Day # 0 PCP CHELLE FERNANDES       Anesthesia Post-op

## 2019-07-03 NOTE — H&P
[facility]. Group Department of  Gastroenterology  Update Health History :       Saturnino Sanders  female   Tiara Matt MD     [id number]  1/18/1950 Primary Care Physician  Riddhi Truong        HPI :  Lo Comment:Strange thoughts  Cephalosporins              Comment:Itchy throat  Ciprofloxacin           RASH  Clarithromycin [Cla*        Comment:Unknown reaction  Keflex [Cephalexin]     HIVES  Levaquin [Levofloxa*    NAUSEA AND VOMITING  Penicillins 5 MG Oral Tab Take 5 mg by mouth daily. Disp:  Rfl:    Albuterol Sulfate HFA (PROAIR HFA) 108 (90 BASE) MCG/ACT Inhalation Aero Soln Inhale 2 puffs into the lungs every 6 (six) hours as needed.  Disp:  Rfl:    magnesium 250 MG Oral Tab Take 500 mg by mout intact    Plan:   colonoscopy  The risks and benefits of the procedure were discussed in detail with the patient, alternatives and options were all discussed, all questions were answered, patient verbalized understanding and agreed to proceed with procedur

## 2019-07-03 NOTE — BRIEF OP NOTE
Pre-Operative Diagnosis: Crohn's disease of colon without complication ([facility] [N13.55]     Post-Operative Diagnosis: * No post-op diagnosis entered *      Procedure Performed:   Procedure(s):  COLONOSCOPY with cold forcep biopsies    Surgeon(s) and Role:

## 2019-07-12 NOTE — PROGRESS NOTES
7/12/2019  [address] [place] [id number]    Dear Julia Reynolds,       Here are the  biopsy/pathology findings from your recent Colonoscopy :    a normal biopsy.       Follow-up information:    Biopsies ok repeat colonoscopy in 2 year

## 2019-10-30 ENCOUNTER — TELEPHONE (OUTPATIENT)
Dept: RHEUMATOLOGY | Facility: CLINIC | Age: 69
End: 2019-10-30

## 2019-10-30 ENCOUNTER — OFFICE VISIT (OUTPATIENT)
Dept: RHEUMATOLOGY | Facility: CLINIC | Age: 69
End: 2019-10-30
Payer: MEDICARE

## 2019-10-30 VITALS
RESPIRATION RATE: 20 BRPM | HEART RATE: 68 BPM | BODY MASS INDEX: 48.21 KG/M2 | WEIGHT: 262 LBS | DIASTOLIC BLOOD PRESSURE: 68 MMHG | SYSTOLIC BLOOD PRESSURE: 126 MMHG | HEIGHT: 62 IN

## 2019-10-30 DIAGNOSIS — G89.29 CHRONIC PAIN OF BOTH SHOULDERS: ICD-10-CM

## 2019-10-30 DIAGNOSIS — M19.011 PRIMARY OSTEOARTHRITIS OF BOTH SHOULDERS: ICD-10-CM

## 2019-10-30 DIAGNOSIS — K63.9 ARTHRITIS ASSOCIATED WITH INFLAMMATORY BOWEL DISEASE: Primary | ICD-10-CM

## 2019-10-30 DIAGNOSIS — M19.012 PRIMARY OSTEOARTHRITIS OF BOTH SHOULDERS: ICD-10-CM

## 2019-10-30 DIAGNOSIS — E66.01 CLASS 3 SEVERE OBESITY DUE TO EXCESS CALORIES WITHOUT SERIOUS COMORBIDITY WITH BODY MASS INDEX (BMI) OF 50.0 TO 59.9 IN ADULT (HCC): ICD-10-CM

## 2019-10-30 DIAGNOSIS — E11.9 TYPE 2 DIABETES MELLITUS WITHOUT COMPLICATION, WITHOUT LONG-TERM CURRENT USE OF INSULIN (HCC): ICD-10-CM

## 2019-10-30 DIAGNOSIS — M25.511 CHRONIC PAIN OF BOTH SHOULDERS: ICD-10-CM

## 2019-10-30 DIAGNOSIS — M25.512 CHRONIC PAIN OF BOTH SHOULDERS: ICD-10-CM

## 2019-10-30 DIAGNOSIS — M07.60 ARTHRITIS ASSOCIATED WITH INFLAMMATORY BOWEL DISEASE: Primary | ICD-10-CM

## 2019-10-30 DIAGNOSIS — M19.011 PRIMARY OSTEOARTHRITIS OF RIGHT SHOULDER: ICD-10-CM

## 2019-10-30 DIAGNOSIS — M19.012 PRIMARY OSTEOARTHRITIS OF LEFT SHOULDER: ICD-10-CM

## 2019-10-30 PROCEDURE — 99213 OFFICE O/P EST LOW 20 MIN: CPT | Performed by: INTERNAL MEDICINE

## 2019-10-30 PROCEDURE — 20610 DRAIN/INJ JOINT/BURSA W/O US: CPT | Performed by: INTERNAL MEDICINE

## 2019-10-30 RX ORDER — PREDNISONE 1 MG/1
5 TABLET ORAL 2 TIMES DAILY PRN
Qty: 120 TABLET | Refills: 3 | Status: SHIPPED | OUTPATIENT
Start: 2019-10-30 | End: 2020-07-01

## 2019-10-30 RX ORDER — TRAMADOL HYDROCHLORIDE 50 MG/1
TABLET ORAL EVERY 6 HOURS PRN
Qty: 100 TABLET | Refills: 5 | Status: SHIPPED | OUTPATIENT
Start: 2019-10-30 | End: 2020-07-01

## 2019-10-30 RX ORDER — METHYLPREDNISOLONE ACETATE 40 MG/ML
40 INJECTION, SUSPENSION INTRA-ARTICULAR; INTRALESIONAL; INTRAMUSCULAR; SOFT TISSUE ONCE
Status: COMPLETED | OUTPATIENT
Start: 2019-10-30 | End: 2019-10-30

## 2019-10-30 RX ORDER — ACETAMINOPHEN AND CODEINE PHOSPHATE 300; 30 MG/1; MG/1
1 TABLET ORAL EVERY 6 HOURS PRN
Qty: 100 TABLET | Refills: 4 | Status: SHIPPED | OUTPATIENT
Start: 2019-10-30 | End: 2020-07-01

## 2019-10-30 RX ADMIN — METHYLPREDNISOLONE ACETATE 40 MG: 40 INJECTION, SUSPENSION INTRA-ARTICULAR; INTRALESIONAL; INTRAMUSCULAR; SOFT TISSUE at 11:30:00

## 2019-10-30 NOTE — PROCEDURES
Bilateral shoulder injections. Bilateral shoulder pain. Osteoarthritis of the shoulders. Arthritis associated with Crohn's disease. After obtaining consent from the patient, both shoulders were cleansed with a combination of Betadine and alcohol wipes.

## 2019-10-30 NOTE — PATIENT INSTRUCTIONS
Today both shoulders were injected with cortisone. Go home and relax. Apply ice later on the shoulders if needed. If severe pain occurs take tramadol 50 mg 1 or 2 a day. If tramadol does not work then take Tylenol 3 1 or 2 tablets for pain.   Continue t

## 2019-10-30 NOTE — PROGRESS NOTES
[facility]  Dr. Ela Ramey Progress Note     Subjective:   Cherelle Colbert is a(n) 71year old female.    Current complaints: Patient presents with:  Arthritis: 6 month f/u arthritis associated with inflammatory bowel disease, has a cough s/p cold  C/o ranulfo

## 2019-10-30 NOTE — TELEPHONE ENCOUNTER
Trey called requesting clarification on tramadol sig. Please advise. mp    Tramadol script  Sig:   Take 1-2 tablets ( mg total) by mouth every 6 (six) hours as needed for Pain. Up to 3 per day    Pt instructions:   Today both shoulders were injec

## 2019-10-31 ENCOUNTER — TELEPHONE (OUTPATIENT)
Dept: RHEUMATOLOGY | Facility: CLINIC | Age: 69
End: 2019-10-31

## 2019-10-31 NOTE — TELEPHONE ENCOUNTER
Patient takes either tramadol or Tylenol 3 for pain. Her pharmacy will only give her a week's worth of medicine with her new policy on narcotic refills. Therefore after she receives her weeks worth then we will have to give her a new refill.   She general

## 2019-11-01 ENCOUNTER — TELEPHONE (OUTPATIENT)
Dept: RHEUMATOLOGY | Facility: CLINIC | Age: 69
End: 2019-11-01

## 2020-07-01 ENCOUNTER — OFFICE VISIT (OUTPATIENT)
Dept: RHEUMATOLOGY | Facility: CLINIC | Age: 70
End: 2020-07-01
Payer: MEDICARE

## 2020-07-01 VITALS
TEMPERATURE: 97 F | HEIGHT: 62 IN | BODY MASS INDEX: 48.21 KG/M2 | HEART RATE: 76 BPM | DIASTOLIC BLOOD PRESSURE: 84 MMHG | SYSTOLIC BLOOD PRESSURE: 124 MMHG | WEIGHT: 262 LBS | RESPIRATION RATE: 20 BRPM

## 2020-07-01 DIAGNOSIS — E11.9 TYPE 2 DIABETES MELLITUS WITHOUT COMPLICATION, WITHOUT LONG-TERM CURRENT USE OF INSULIN (HCC): ICD-10-CM

## 2020-07-01 DIAGNOSIS — K50.10 CROHN'S DISEASE OF COLON WITHOUT COMPLICATION (HCC): ICD-10-CM

## 2020-07-01 DIAGNOSIS — M07.60 ARTHRITIS ASSOCIATED WITH INFLAMMATORY BOWEL DISEASE: Primary | ICD-10-CM

## 2020-07-01 DIAGNOSIS — E66.01 CLASS 3 SEVERE OBESITY DUE TO EXCESS CALORIES WITHOUT SERIOUS COMORBIDITY WITH BODY MASS INDEX (BMI) OF 50.0 TO 59.9 IN ADULT (HCC): ICD-10-CM

## 2020-07-01 DIAGNOSIS — K63.9 ARTHRITIS ASSOCIATED WITH INFLAMMATORY BOWEL DISEASE: Primary | ICD-10-CM

## 2020-07-01 PROCEDURE — 99213 OFFICE O/P EST LOW 20 MIN: CPT | Performed by: INTERNAL MEDICINE

## 2020-07-01 RX ORDER — LIDOCAINE 50 MG/G
2 PATCH TOPICAL EVERY 24 HOURS
Qty: 60 PATCH | Refills: 3 | Status: SHIPPED | OUTPATIENT
Start: 2020-07-01

## 2020-07-01 RX ORDER — TRAMADOL HYDROCHLORIDE 50 MG/1
TABLET ORAL EVERY 6 HOURS PRN
Qty: 100 TABLET | Refills: 5 | Status: SHIPPED | OUTPATIENT
Start: 2020-07-01 | End: 2020-10-26

## 2020-07-01 RX ORDER — PREDNISONE 1 MG/1
5 TABLET ORAL 2 TIMES DAILY PRN
Qty: 120 TABLET | Refills: 5 | Status: SHIPPED | OUTPATIENT
Start: 2020-07-01 | End: 2020-10-26

## 2020-07-01 RX ORDER — CYCLOBENZAPRINE HCL 10 MG
TABLET ORAL
Qty: 90 TABLET | Refills: 2 | Status: SHIPPED | OUTPATIENT
Start: 2020-07-01 | End: 2020-09-14

## 2020-07-01 RX ORDER — ACETAMINOPHEN AND CODEINE PHOSPHATE 300; 30 MG/1; MG/1
1 TABLET ORAL EVERY 6 HOURS PRN
Qty: 100 TABLET | Refills: 4 | Status: SHIPPED | OUTPATIENT
Start: 2020-07-01 | End: 2020-10-26

## 2020-07-01 RX ORDER — TRIAMTERENE AND HYDROCHLOROTHIAZIDE 37.5; 25 MG/1; MG/1
1 CAPSULE ORAL EVERY MORNING
Qty: 30 CAPSULE | Refills: 5 | Status: SHIPPED | OUTPATIENT
Start: 2020-07-01

## 2020-07-01 NOTE — PROGRESS NOTES
[facility]  Dr. Betina Harmon Progress Note     Subjective:   Jenna Dowd is a(n) 79year old female. Current complaints: Patient presents with: Follow - Up: est pt- Pt co bilateral shoulder pain. Left leg swelling.  Great toe feelings like its burning

## 2020-07-01 NOTE — PATIENT INSTRUCTIONS
Try to lose some weight by low-fat low calorie diet. Low-salt diet so that your legs do not swell. When you are sitting later in the day keep your legs elevated. Wear compression stockings. Take an occasional Dyazide for swollen legs.   For arthritis pa

## 2020-09-14 RX ORDER — CYCLOBENZAPRINE HCL 10 MG
TABLET ORAL
Qty: 90 TABLET | Refills: 2 | Status: SHIPPED | OUTPATIENT
Start: 2020-09-14 | End: 2020-10-26

## 2020-10-06 ENCOUNTER — OFFICE VISIT (OUTPATIENT)
Dept: PAIN CLINIC | Facility: CLINIC | Age: 70
End: 2020-10-06
Payer: MEDICARE

## 2020-10-06 VITALS
DIASTOLIC BLOOD PRESSURE: 74 MMHG | HEIGHT: 61 IN | HEART RATE: 91 BPM | WEIGHT: 262 LBS | SYSTOLIC BLOOD PRESSURE: 142 MMHG | OXYGEN SATURATION: 94 % | BODY MASS INDEX: 49.47 KG/M2

## 2020-10-06 DIAGNOSIS — M54.16 LUMBAR RADICULITIS: Primary | ICD-10-CM

## 2020-10-06 PROCEDURE — 99214 OFFICE O/P EST MOD 30 MIN: CPT | Performed by: ANESTHESIOLOGY

## 2020-10-06 RX ORDER — CEFDINIR 300 MG/1
300 CAPSULE ORAL 2 TIMES DAILY
COMMUNITY
Start: 2020-05-26 | End: 2020-10-26 | Stop reason: ALTCHOICE

## 2020-10-06 RX ORDER — DOXYCYCLINE HYCLATE 100 MG/1
CAPSULE ORAL
COMMUNITY
Start: 2020-04-21

## 2020-10-06 RX ORDER — CYANOCOBALAMIN 1000 UG/ML
INJECTION INTRAMUSCULAR; SUBCUTANEOUS
COMMUNITY
Start: 2020-08-03

## 2020-10-06 RX ORDER — AZITHROMYCIN 250 MG/1
TABLET, FILM COATED ORAL
COMMUNITY
Start: 2020-06-25

## 2020-10-06 RX ORDER — LEVOCETIRIZINE DIHYDROCHLORIDE 5 MG/1
5 TABLET, FILM COATED ORAL DAILY
COMMUNITY
Start: 2020-07-24

## 2020-10-06 RX ORDER — FLUTICASONE PROPIONATE 50 MCG
SPRAY, SUSPENSION (ML) NASAL
COMMUNITY
Start: 2020-07-24

## 2020-10-06 RX ORDER — CLOTRIMAZOLE 1 %
CREAM (GRAM) TOPICAL
COMMUNITY
Start: 2020-08-03

## 2020-10-06 RX ORDER — FLUCONAZOLE 150 MG/1
150 TABLET ORAL
COMMUNITY
Start: 2020-04-21

## 2020-10-06 NOTE — PROGRESS NOTES
PHYSICAL EXAM:   Physical Exam   Constitutional:           Patient presents in office today with reported pain in lower back and joint arthritis    Current pain level reported = 7/10    Last reported dose of traMADol HCl 50 MG Oral Tab pt states in the A

## 2020-10-08 NOTE — PROGRESS NOTES
Name: Jenna Dowd   : 1950   DOS: 10/6/2020     Chief complaint: Low back pain    History of present illness:  Jenna Dowd is a 79year old female complaining of  pain in the lower back for 6 years.   Pain is sharp shooting in nature associate nostril twice daily x at least 2 wks. or until all symptoms are gone     • Levocetirizine Dihydrochloride 5 MG Oral Tab Take 5 mg by mouth daily.      • cyanocobalamin 1000 MCG/ML Injection Solution INJECT 1 ML INTO THE MUSCLE Q 14 DAYS     • LILLYIRA PEN 40 INTEGRA SYRINGE 25G X 1\" 3 ML Does not apply Misc INJECT 1 ML INTO THE MUSCLE Q 14 DAYS  0   • glimepiride 2 MG Oral Tab TK 1 T PO BID  3   • KRISTINA DISP NEEDLES 25G X 1-1/2\" Does not apply Misc INJECT INTO THE SKIN Q 14 DAYS  0   • ergocalciferol 04087 UN 40.00        Pack years: 48        Types: Cigarettes      Smokeless tobacco: Never Used    Alcohol use:  Yes      Alcohol/week: 0.0 standard drinks      Comment: rare    Drug use: No      Review of  other systems:  Constitutional: negative for fever, weight negative. Raleigh maneuver is normal. Radial pulsation is palpable bilaterally. Phalen’s and Tinnel’s sign is negative. Lhermitte’s test is negative.     Motor Examination:    (R)   (L)  Deltoid:      5    5  Biceps:       5    5  Triceps:      5    5  Wri Clonus:    N                          N  Sensory Examination:    (R)   (L)   LI:      N                          N   L2:      N     N   L3:      N               N   L4:      N                          N   L5:      N                          N    S1:      N

## 2020-10-09 DIAGNOSIS — M54.16 LUMBAR RADICULITIS: Primary | ICD-10-CM

## 2020-10-09 RX ORDER — DIAZEPAM 5 MG/1
TABLET ORAL
Qty: 2 TABLET | Refills: 0 | Status: SHIPPED | OUTPATIENT
Start: 2020-10-09 | End: 2020-10-26 | Stop reason: ALTCHOICE

## 2020-10-09 NOTE — TELEPHONE ENCOUNTER
pt is asking for rx to help with getting MRI, very claustrophobic. Please send to [facility] in [place].  Scheduled for Monday at 6:30 here at [facility].

## 2020-10-09 NOTE — TELEPHONE ENCOUNTER
Valium 5 mg Rx sent to pharmacy. Patient to take one tab night prior to test and second tab 45 minutes before test. **DO NOT DRIVE.    Receipt confirmed by pharmacy (10/9/2020  3:04 PM CDT)    Left a detailed message on patient's confidential voicemail info

## 2020-10-13 RX ORDER — TRIAMTERENE AND HYDROCHLOROTHIAZIDE 37.5; 25 MG/1; MG/1
CAPSULE ORAL
Qty: 30 CAPSULE | Refills: 5 | OUTPATIENT
Start: 2020-10-13

## 2020-10-13 RX ORDER — TRIAMTERENE AND HYDROCHLOROTHIAZIDE 37.5; 25 MG/1; MG/1
CAPSULE ORAL
Qty: 90 CAPSULE | Refills: 0 | OUTPATIENT
Start: 2020-10-13

## 2020-10-16 ENCOUNTER — HOSPITAL ENCOUNTER (OUTPATIENT)
Dept: MRI IMAGING | Facility: HOSPITAL | Age: 70
Discharge: HOME | End: 2020-10-16
Attending: ANESTHESIOLOGY
Payer: MEDICARE

## 2020-10-16 DIAGNOSIS — M54.16 LUMBAR RADICULITIS: ICD-10-CM

## 2020-10-16 PROCEDURE — 72148 MRI LUMBAR SPINE W/O DYE: CPT | Performed by: ANESTHESIOLOGY

## 2020-10-20 ENCOUNTER — TELEPHONE (OUTPATIENT)
Dept: SURGERY | Facility: CLINIC | Age: 70
End: 2020-10-20

## 2020-10-20 DIAGNOSIS — M54.16 LUMBAR RADICULOPATHY: Primary | ICD-10-CM

## 2020-10-21 NOTE — TELEPHONE ENCOUNTER
Pt is still in a lot of pain. Asks that pllease if calling patient's home phone with results please call in afternoon as  has appointments in morning.

## 2020-10-21 NOTE — TELEPHONE ENCOUNTER
Noted MRI Spine Lumbar completed 10/16/20     Patient was seen on office with Dr Reese Ralph 10/6/20

## 2020-10-22 NOTE — TELEPHONE ENCOUNTER
Dr. Rachel Hough has reviewed lumbar MRI which reveals multiple bulging discs, most predominantly at the L4-5 level which is likely contributing to symptoms. Dr. Rachel Hough recommends lumbar interlaminar epidural steroid injections at L4-5 level, up to a series of 3.  Jerral Short

## 2020-10-22 NOTE — TELEPHONE ENCOUNTER
Medical clearance needed- No    Implanted cardiac device/SCS/PNS: NA    Pt seen in OV 10/6 with Dr. George Baltazar and after reviewing MRI results recommended for LESI (X 3). Please begin PA process for procedure(s).      Laterality: NA  Level(s): NA    Pt informed

## 2020-10-26 ENCOUNTER — OFFICE VISIT (OUTPATIENT)
Dept: RHEUMATOLOGY | Facility: CLINIC | Age: 70
End: 2020-10-26
Payer: MEDICARE

## 2020-10-26 VITALS
HEIGHT: 61.5 IN | SYSTOLIC BLOOD PRESSURE: 124 MMHG | BODY MASS INDEX: 48.83 KG/M2 | RESPIRATION RATE: 19 BRPM | WEIGHT: 262 LBS | HEART RATE: 88 BPM | TEMPERATURE: 97 F | OXYGEN SATURATION: 98 % | DIASTOLIC BLOOD PRESSURE: 80 MMHG

## 2020-10-26 DIAGNOSIS — K50.10 CROHN'S DISEASE OF COLON WITHOUT COMPLICATION (HCC): ICD-10-CM

## 2020-10-26 DIAGNOSIS — M48.061 SPINAL STENOSIS OF LUMBAR REGION WITHOUT NEUROGENIC CLAUDICATION: ICD-10-CM

## 2020-10-26 DIAGNOSIS — M51.36 DEGENERATIVE DISC DISEASE, LUMBAR: ICD-10-CM

## 2020-10-26 DIAGNOSIS — K63.9 ARTHRITIS ASSOCIATED WITH INFLAMMATORY BOWEL DISEASE: Primary | ICD-10-CM

## 2020-10-26 DIAGNOSIS — M07.60 ARTHRITIS ASSOCIATED WITH INFLAMMATORY BOWEL DISEASE: Primary | ICD-10-CM

## 2020-10-26 DIAGNOSIS — E66.01 CLASS 3 SEVERE OBESITY DUE TO EXCESS CALORIES WITHOUT SERIOUS COMORBIDITY WITH BODY MASS INDEX (BMI) OF 45.0 TO 49.9 IN ADULT (HCC): ICD-10-CM

## 2020-10-26 PROBLEM — M51.369 DEGENERATIVE DISC DISEASE, LUMBAR: Status: ACTIVE | Noted: 2020-10-26

## 2020-10-26 PROCEDURE — 99213 OFFICE O/P EST LOW 20 MIN: CPT | Performed by: INTERNAL MEDICINE

## 2020-10-26 RX ORDER — TRAMADOL HYDROCHLORIDE 50 MG/1
TABLET ORAL EVERY 6 HOURS PRN
Qty: 100 TABLET | Refills: 5 | Status: SHIPPED | OUTPATIENT
Start: 2020-10-26 | End: 2021-04-26

## 2020-10-26 RX ORDER — CYCLOBENZAPRINE HCL 10 MG
TABLET ORAL
Qty: 90 TABLET | Refills: 3 | Status: SHIPPED | OUTPATIENT
Start: 2020-10-26 | End: 2021-04-23

## 2020-10-26 RX ORDER — PREDNISONE 1 MG/1
5 TABLET ORAL 2 TIMES DAILY PRN
Qty: 120 TABLET | Refills: 5 | Status: SHIPPED | OUTPATIENT
Start: 2020-10-26 | End: 2021-04-26

## 2020-10-26 RX ORDER — ACETAMINOPHEN AND CODEINE PHOSPHATE 300; 30 MG/1; MG/1
1 TABLET ORAL EVERY 6 HOURS PRN
Qty: 100 TABLET | Refills: 2 | Status: SHIPPED | OUTPATIENT
Start: 2020-10-26 | End: 2021-10-26 | Stop reason: WASHOUT

## 2020-10-26 NOTE — PATIENT INSTRUCTIONS
Continue Prednisone 5 - 10 mg per day as needed. Use Mesalamine and Humira to control the Crohn's. For pain Tramadol 50 mg as needed for pain one every 4-6 hours as needed. For more severe pain take Tylenol 3 1 tablet every 8 hours as needed.   For muscle

## 2020-10-26 NOTE — TELEPHONE ENCOUNTER
Pt returning call to schedule injection. Pt awaiting call back. Pt stated that once she gets home she will only be available on her house number, so please call there if she does not answer her cell.

## 2020-10-26 NOTE — TELEPHONE ENCOUNTER
Patient advised of insurance approval to proceed with injections and is agreeable to scheduling. Pre-procedure instructions reviewed. Patient prefers conscious sedation.  Reviewed sedation instructions including need to be fasted, have a ride and complete a

## 2020-10-26 NOTE — PROGRESS NOTES
[facility]  Dr. Nestor Medellin Progress Note     Subjective:   Sourav Neri is a(n) 79year old female. Current complaints: Patient presents with: Follow - Up: MRI back done 10/2020, Dr. Ellis Lauren ordered. Shoulders hurting, on and off.    Seeing Dr Wilver page

## 2020-10-28 ENCOUNTER — TELEPHONE (OUTPATIENT)
Dept: SURGERY | Facility: CLINIC | Age: 70
End: 2020-10-28

## 2020-10-28 NOTE — TELEPHONE ENCOUNTER
Spoke to patient to schedule injections. Patient elects to undergo procedure with conscious sedation, reviewed associated ride and fasting requirements. Patient has no medications listed requiring a hold.  Patient denies use of OTC NSAIDs or supplements ? You are required to have a responsible adult drive you home after your procedure. You may not take a cab unless you have a responsible adult with you in the cab. ?  A family member or friend is required to stay in our waiting room because of the sedation Ibuprofen (Motrin, Advil, Vicoprofen), Naproxen (Naprosyn, Aleve), Piroxcam (Feldene), Meloxicam (Mobic)--(Cervical procedures will require 3 days), Oxaprozin (Daypro), Diclofenac (Voltaren), Indomethacin (Indocin), Etodolac (Lodine), Nabumetone (Relafen

## 2020-10-28 NOTE — TELEPHONE ENCOUNTER
Pt calling to provide dates that she is able to do the injection. Pt awwaiting call back at home phone # [phone].

## 2020-11-06 ENCOUNTER — APPOINTMENT (OUTPATIENT)
Dept: LAB | Facility: HOSPITAL | Age: 70
End: 2020-11-06
Attending: ANESTHESIOLOGY
Payer: MEDICARE

## 2020-11-06 DIAGNOSIS — M54.16 LUMBAR RADICULOPATHY: ICD-10-CM

## 2020-11-09 ENCOUNTER — APPOINTMENT (OUTPATIENT)
Dept: GENERAL RADIOLOGY | Facility: HOSPITAL | Age: 70
End: 2020-11-09
Attending: ANESTHESIOLOGY
Payer: MEDICARE

## 2020-11-09 ENCOUNTER — HOSPITAL ENCOUNTER (OUTPATIENT)
Facility: HOSPITAL | Age: 70
Setting detail: HOSPITAL OUTPATIENT SURGERY
Discharge: HOME | End: 2020-11-09
Attending: ANESTHESIOLOGY | Admitting: ANESTHESIOLOGY
Payer: MEDICARE

## 2020-11-09 VITALS
SYSTOLIC BLOOD PRESSURE: 144 MMHG | DIASTOLIC BLOOD PRESSURE: 57 MMHG | HEART RATE: 71 BPM | TEMPERATURE: 99 F | OXYGEN SATURATION: 98 % | RESPIRATION RATE: 18 BRPM

## 2020-11-09 DIAGNOSIS — M54.16 LUMBAR RADICULOPATHY: ICD-10-CM

## 2020-11-09 PROCEDURE — 82962 GLUCOSE BLOOD TEST: CPT

## 2020-11-09 PROCEDURE — 99152 MOD SED SAME PHYS/QHP 5/>YRS: CPT | Performed by: ANESTHESIOLOGY

## 2020-11-09 PROCEDURE — 3E0R3BZ INTRODUCTION OF ANESTHETIC AGENT INTO SPINAL CANAL, PERCUTANEOUS APPROACH: ICD-10-PCS | Performed by: ANESTHESIOLOGY

## 2020-11-09 PROCEDURE — 3E0R33Z INTRODUCTION OF ANTI-INFLAMMATORY INTO SPINAL CANAL, PERCUTANEOUS APPROACH: ICD-10-PCS | Performed by: ANESTHESIOLOGY

## 2020-11-09 RX ORDER — SODIUM CHLORIDE, SODIUM LACTATE, POTASSIUM CHLORIDE, CALCIUM CHLORIDE 600; 310; 30; 20 MG/100ML; MG/100ML; MG/100ML; MG/100ML
100 INJECTION, SOLUTION INTRAVENOUS CONTINUOUS
Status: DISCONTINUED | OUTPATIENT
Start: 2020-11-09 | End: 2020-11-09

## 2020-11-09 RX ORDER — LIDOCAINE HYDROCHLORIDE 10 MG/ML
INJECTION, SOLUTION EPIDURAL; INFILTRATION; INTRACAUDAL; PERINEURAL AS NEEDED
Status: DISCONTINUED | OUTPATIENT
Start: 2020-11-09 | End: 2020-11-09

## 2020-11-09 RX ORDER — SODIUM CHLORIDE 9 MG/ML
INJECTION INTRAVENOUS AS NEEDED
Status: DISCONTINUED | OUTPATIENT
Start: 2020-11-09 | End: 2020-11-09

## 2020-11-09 RX ORDER — ONDANSETRON 2 MG/ML
4 INJECTION INTRAMUSCULAR; INTRAVENOUS ONCE AS NEEDED
Status: DISCONTINUED | OUTPATIENT
Start: 2020-11-09 | End: 2020-11-09

## 2020-11-09 RX ORDER — MIDAZOLAM HYDROCHLORIDE 1 MG/ML
INJECTION INTRAMUSCULAR; INTRAVENOUS AS NEEDED
Status: DISCONTINUED | OUTPATIENT
Start: 2020-11-09 | End: 2020-11-09

## 2020-11-09 RX ORDER — DIPHENHYDRAMINE HYDROCHLORIDE 50 MG/ML
50 INJECTION INTRAMUSCULAR; INTRAVENOUS ONCE AS NEEDED
Status: DISCONTINUED | OUTPATIENT
Start: 2020-11-09 | End: 2020-11-09

## 2020-11-09 RX ORDER — DEXTROSE MONOHYDRATE 25 G/50ML
50 INJECTION, SOLUTION INTRAVENOUS
Status: DISCONTINUED | OUTPATIENT
Start: 2020-11-09 | End: 2020-11-09

## 2020-11-09 RX ORDER — INSULIN ASPART 100 [IU]/ML
3 INJECTION, SOLUTION INTRAVENOUS; SUBCUTANEOUS ONCE
Status: DISCONTINUED | OUTPATIENT
Start: 2020-11-09 | End: 2020-11-09

## 2020-11-09 RX ORDER — DEXAMETHASONE SODIUM PHOSPHATE 10 MG/ML
INJECTION, SOLUTION INTRAMUSCULAR; INTRAVENOUS AS NEEDED
Status: DISCONTINUED | OUTPATIENT
Start: 2020-11-09 | End: 2020-11-09

## 2020-11-09 NOTE — OPERATIVE REPORT
[facility]  Operative Report  2020     Iesha Shani Patient Status:  Hospital Outpatient Surgery    1950 MRN [id number]   [facility] ENDOSCOPY Attending No att. providers found   [facility] Day # 0  [facility], keke Fonseca [facility] epidurogram was obtained. Thereafter, dexamethasone 10 mg with normal saline of 5 mL in total volume of 6 mL was injected through the Tuohy needle. The needle was flushed with 1 mL lidocaine. The needle was withdrawn with the stylet intact in situ.   The

## 2020-11-09 NOTE — H&P
History & Physical Examination    Patient Name: Faby Pump  MRN: [id number]  CSN: [id number]  YOB: 1950    Pre-Operative Diagnosis:  Lumbar radiculopathy [M54.16]    Present Illness: 77-year-old female patient with chronic low back pain fa daily x at least 2 wks. or until all symptoms are gone, Disp: , Rfl:     •  Levocetirizine Dihydrochloride 5 MG Oral Tab, Take 5 mg by mouth daily. , Disp: , Rfl:     •  cyanocobalamin 1000 MCG/ML Injection Solution, INJECT 1 ML INTO THE MUSCLE Q 14 DAYS, D MG Oral Tab, TK 1 T PO  Morning, Disp: , Rfl: 1    •  Ondansetron HCl (ZOFRAN) 8 MG Oral Tab, Take 1 tablet by mouth every 8 (eight) hours as needed for Nausea., Disp: 30 tablet, Rfl: 0    •  Acidophilus/Pectin (PROBIOTIC) Oral Cap, Take 1 capsule by mouth VOMITING  Penicillins                 Comment:Itchy throat  Sulfa Antibiotics       HIVES  Sulfamethoxazole W/*    HIVES    Past Medical History:   Diagnosis Date   • Anemia     Borderline   • Anxiety    • Arthritis    • COPD (chronic obstructive pulmonary [ x ] I have discussed the risks and benefits and alternatives with the patient/family. They understand and agree to proceed with plan of care. [ x ] I have reviewed the History and Physical done within the last 30 days. Any changes noted above.

## 2020-11-15 ENCOUNTER — APPOINTMENT (OUTPATIENT)
Dept: LAB | Facility: HOSPITAL | Age: 70
End: 2020-11-15
Attending: ANESTHESIOLOGY
Payer: MEDICARE

## 2020-11-15 DIAGNOSIS — M54.16 LUMBAR RADICULOPATHY: ICD-10-CM

## 2020-11-18 ENCOUNTER — HOSPITAL ENCOUNTER (OUTPATIENT)
Facility: HOSPITAL | Age: 70
Setting detail: HOSPITAL OUTPATIENT SURGERY
Discharge: HOME | End: 2020-11-18
Attending: ANESTHESIOLOGY | Admitting: ANESTHESIOLOGY
Payer: MEDICARE

## 2020-11-18 ENCOUNTER — APPOINTMENT (OUTPATIENT)
Dept: GENERAL RADIOLOGY | Facility: HOSPITAL | Age: 70
End: 2020-11-18
Attending: ANESTHESIOLOGY
Payer: MEDICARE

## 2020-11-18 VITALS
OXYGEN SATURATION: 95 % | SYSTOLIC BLOOD PRESSURE: 126 MMHG | TEMPERATURE: 97 F | HEART RATE: 77 BPM | RESPIRATION RATE: 17 BRPM | DIASTOLIC BLOOD PRESSURE: 69 MMHG

## 2020-11-18 DIAGNOSIS — M54.16 LUMBAR RADICULOPATHY: ICD-10-CM

## 2020-11-18 PROCEDURE — B01B1ZZ FLUOROSCOPY OF SPINAL CORD USING LOW OSMOLAR CONTRAST: ICD-10-PCS | Performed by: ANESTHESIOLOGY

## 2020-11-18 PROCEDURE — 3E0S33Z INTRODUCTION OF ANTI-INFLAMMATORY INTO EPIDURAL SPACE, PERCUTANEOUS APPROACH: ICD-10-PCS | Performed by: ANESTHESIOLOGY

## 2020-11-18 PROCEDURE — 82962 GLUCOSE BLOOD TEST: CPT

## 2020-11-18 PROCEDURE — 99152 MOD SED SAME PHYS/QHP 5/>YRS: CPT | Performed by: ANESTHESIOLOGY

## 2020-11-18 RX ORDER — DEXTROSE MONOHYDRATE 25 G/50ML
50 INJECTION, SOLUTION INTRAVENOUS
Status: DISCONTINUED | OUTPATIENT
Start: 2020-11-18 | End: 2020-11-18

## 2020-11-18 RX ORDER — ONDANSETRON 2 MG/ML
4 INJECTION INTRAMUSCULAR; INTRAVENOUS ONCE AS NEEDED
Status: DISCONTINUED | OUTPATIENT
Start: 2020-11-18 | End: 2020-11-18

## 2020-11-18 RX ORDER — DIPHENHYDRAMINE HYDROCHLORIDE 50 MG/ML
50 INJECTION INTRAMUSCULAR; INTRAVENOUS ONCE AS NEEDED
Status: DISCONTINUED | OUTPATIENT
Start: 2020-11-18 | End: 2020-11-18

## 2020-11-18 RX ORDER — SODIUM CHLORIDE 9 MG/ML
INJECTION INTRAVENOUS AS NEEDED
Status: DISCONTINUED | OUTPATIENT
Start: 2020-11-18 | End: 2020-11-18

## 2020-11-18 RX ORDER — MIDAZOLAM HYDROCHLORIDE 1 MG/ML
INJECTION INTRAMUSCULAR; INTRAVENOUS AS NEEDED
Status: DISCONTINUED | OUTPATIENT
Start: 2020-11-18 | End: 2020-11-18

## 2020-11-18 RX ORDER — LIDOCAINE HYDROCHLORIDE 10 MG/ML
INJECTION, SOLUTION EPIDURAL; INFILTRATION; INTRACAUDAL; PERINEURAL AS NEEDED
Status: DISCONTINUED | OUTPATIENT
Start: 2020-11-18 | End: 2020-11-18

## 2020-11-18 RX ORDER — DEXAMETHASONE SODIUM PHOSPHATE 10 MG/ML
INJECTION, SOLUTION INTRAMUSCULAR; INTRAVENOUS AS NEEDED
Status: DISCONTINUED | OUTPATIENT
Start: 2020-11-18 | End: 2020-11-18

## 2020-11-18 RX ORDER — SODIUM CHLORIDE, SODIUM LACTATE, POTASSIUM CHLORIDE, CALCIUM CHLORIDE 600; 310; 30; 20 MG/100ML; MG/100ML; MG/100ML; MG/100ML
100 INJECTION, SOLUTION INTRAVENOUS CONTINUOUS
Status: DISCONTINUED | OUTPATIENT
Start: 2020-11-18 | End: 2020-11-18

## 2020-11-18 RX ORDER — INSULIN ASPART 100 [IU]/ML
3 INJECTION, SOLUTION INTRAVENOUS; SUBCUTANEOUS ONCE
Status: DISCONTINUED | OUTPATIENT
Start: 2020-11-18 | End: 2020-11-18

## 2020-11-18 NOTE — H&P
History & Physical Examination    Patient Name: Nakia Funes  MRN: [id number]  CSN: [id number]  YOB: 1950    Pre-Operative Diagnosis:  Lumbar radiculopathy [M54.16]    Present Illness: 45-year-old female patient with chronic low back pain fa • COLECTOMY     • COLONOSCOPY N/A 7/3/2019    Performed by Alexsander Martin MD at [facility] ENDOSCOPY   • COLONOSCOPY N/A 12/18/2013    Performed by Alexsander Martin MD at [place] [address]  2010, 2004    colonoscopy   • EYE SURGERY     •

## 2020-11-18 NOTE — H&P (VIEW-ONLY)
History & Physical Examination    Patient Name: Haley Iniguez  MRN: [id number]  CSN: [id number]  YOB: 1950    Pre-Operative Diagnosis:  Lumbar radiculopathy [M54.16]    Present Illness: 70-year-old female patient with chronic low back pain fa • COLECTOMY     • COLONOSCOPY N/A 7/3/2019    Performed by Macarena Maier MD at [facility] ENDOSCOPY   • COLONOSCOPY N/A 12/18/2013    Performed by Macarena Maier MD at [place] [address]  2010, 2004    colonoscopy   • EYE SURGERY     •

## 2020-11-18 NOTE — OPERATIVE REPORT
[facility]  Operative Report  2020     Steffanie Folrez Patient Status:  Hospital Outpatient Surgery    1950 MRN [id number]   [facility] ENDOSCOPY Attending No att. providers found   Hosp Day # 0 [facility] [address]. Thereafter, dexamethasone 10 mg with normal saline of 5 mL in total volume of 6 mL was injected through the Tuohy needle. The needle was flushed with 1 mL lidocaine. The needle was withdrawn with the stylet intact in situ.   The

## 2020-11-23 NOTE — TELEPHONE ENCOUNTER
Spoke with patient, requesting to reschedule 11/30 inj to 12/14 check-in at 10:30am, case time 11:30am. Patient verbalized understanding and was very appreciative of the assistance. No further needs. Case changed on OR schedule, new COVID test ordered.

## 2020-12-11 ENCOUNTER — LAB ENCOUNTER (OUTPATIENT)
Dept: LAB | Facility: HOSPITAL | Age: 70
End: 2020-12-11
Attending: ANESTHESIOLOGY
Payer: MEDICARE

## 2020-12-11 DIAGNOSIS — M54.16 LUMBAR RADICULOPATHY: ICD-10-CM

## 2020-12-14 ENCOUNTER — APPOINTMENT (OUTPATIENT)
Dept: GENERAL RADIOLOGY | Facility: HOSPITAL | Age: 70
End: 2020-12-14
Attending: ANESTHESIOLOGY
Payer: MEDICARE

## 2020-12-14 ENCOUNTER — HOSPITAL ENCOUNTER (OUTPATIENT)
Facility: HOSPITAL | Age: 70
Setting detail: HOSPITAL OUTPATIENT SURGERY
Discharge: HOME | End: 2020-12-14
Attending: ANESTHESIOLOGY | Admitting: ANESTHESIOLOGY
Payer: MEDICARE

## 2020-12-14 VITALS
SYSTOLIC BLOOD PRESSURE: 129 MMHG | RESPIRATION RATE: 22 BRPM | OXYGEN SATURATION: 95 % | HEART RATE: 83 BPM | DIASTOLIC BLOOD PRESSURE: 63 MMHG | TEMPERATURE: 98 F

## 2020-12-14 DIAGNOSIS — M54.16 LUMBAR RADICULOPATHY: ICD-10-CM

## 2020-12-14 PROCEDURE — 3E0R33Z INTRODUCTION OF ANTI-INFLAMMATORY INTO SPINAL CANAL, PERCUTANEOUS APPROACH: ICD-10-PCS | Performed by: ANESTHESIOLOGY

## 2020-12-14 PROCEDURE — 99152 MOD SED SAME PHYS/QHP 5/>YRS: CPT | Performed by: ANESTHESIOLOGY

## 2020-12-14 PROCEDURE — 82962 GLUCOSE BLOOD TEST: CPT

## 2020-12-14 PROCEDURE — 3E0R3BZ INTRODUCTION OF ANESTHETIC AGENT INTO SPINAL CANAL, PERCUTANEOUS APPROACH: ICD-10-PCS | Performed by: ANESTHESIOLOGY

## 2020-12-14 RX ORDER — DEXAMETHASONE SODIUM PHOSPHATE 10 MG/ML
INJECTION, SOLUTION INTRAMUSCULAR; INTRAVENOUS AS NEEDED
Status: DISCONTINUED | OUTPATIENT
Start: 2020-12-14 | End: 2020-12-14

## 2020-12-14 RX ORDER — LIDOCAINE HYDROCHLORIDE 10 MG/ML
INJECTION, SOLUTION EPIDURAL; INFILTRATION; INTRACAUDAL; PERINEURAL AS NEEDED
Status: DISCONTINUED | OUTPATIENT
Start: 2020-12-14 | End: 2020-12-14

## 2020-12-14 RX ORDER — DIPHENHYDRAMINE HYDROCHLORIDE 50 MG/ML
50 INJECTION INTRAMUSCULAR; INTRAVENOUS ONCE AS NEEDED
Status: DISCONTINUED | OUTPATIENT
Start: 2020-12-14 | End: 2020-12-14

## 2020-12-14 RX ORDER — MIDAZOLAM HYDROCHLORIDE 1 MG/ML
INJECTION INTRAMUSCULAR; INTRAVENOUS AS NEEDED
Status: DISCONTINUED | OUTPATIENT
Start: 2020-12-14 | End: 2020-12-14

## 2020-12-14 RX ORDER — SODIUM CHLORIDE 9 MG/ML
INJECTION INTRAVENOUS AS NEEDED
Status: DISCONTINUED | OUTPATIENT
Start: 2020-12-14 | End: 2020-12-14

## 2020-12-14 RX ORDER — ONDANSETRON 2 MG/ML
4 INJECTION INTRAMUSCULAR; INTRAVENOUS ONCE AS NEEDED
Status: DISCONTINUED | OUTPATIENT
Start: 2020-12-14 | End: 2020-12-14

## 2020-12-14 RX ORDER — INSULIN ASPART 100 [IU]/ML
3 INJECTION, SOLUTION INTRAVENOUS; SUBCUTANEOUS ONCE
Status: DISCONTINUED | OUTPATIENT
Start: 2020-12-14 | End: 2020-12-14

## 2020-12-14 RX ORDER — DEXTROSE MONOHYDRATE 25 G/50ML
50 INJECTION, SOLUTION INTRAVENOUS
Status: DISCONTINUED | OUTPATIENT
Start: 2020-12-14 | End: 2020-12-14

## 2020-12-14 RX ORDER — SODIUM CHLORIDE, SODIUM LACTATE, POTASSIUM CHLORIDE, CALCIUM CHLORIDE 600; 310; 30; 20 MG/100ML; MG/100ML; MG/100ML; MG/100ML
100 INJECTION, SOLUTION INTRAVENOUS CONTINUOUS
Status: DISCONTINUED | OUTPATIENT
Start: 2020-12-14 | End: 2020-12-14

## 2020-12-14 NOTE — OR NURSING
UPON TESTING PATIENTS LEGS FOR NUMBNESS PT EXPERIENCED NUMBNESS IN BOTH LEGS, WILL BE OBSERVED UNTIL NUMBNESS SUBSIDES

## 2020-12-14 NOTE — INTERVAL H&P NOTE
Pre-op Diagnosis: Lumbar radiculopathy [M54.16]    The above referenced H&P was reviewed by Karen Forte MD on 12/14/2020, the patient was examined and no significant changes have occurred in the patient's condition since the H&P was performed.   I disc

## 2020-12-14 NOTE — OPERATIVE REPORT
[facility]  Operative Report  2020     Haley Iniguez Patient Status:  Hospital Outpatient Surgery    1950 MRN [id number]   [facility] ENDOSCOPY Attending No att. providers found   Hosp Day # 0 PCP [place] [address] epidurogram was obtained. Thereafter, dexamethasone 10 mg with normal saline of 5 mL in total volume of 6 mL was injected through the Tuohy needle. The needle was flushed with 1 mL lidocaine. The needle was withdrawn with the stylet intact in situ.   The

## 2021-02-01 DIAGNOSIS — Z23 NEED FOR VACCINATION: ICD-10-CM

## 2021-04-23 RX ORDER — CYCLOBENZAPRINE HCL 10 MG
TABLET ORAL
Qty: 90 TABLET | Refills: 3 | Status: SHIPPED | OUTPATIENT
Start: 2021-04-23 | End: 2021-04-26

## 2021-04-26 ENCOUNTER — OFFICE VISIT (OUTPATIENT)
Dept: RHEUMATOLOGY | Facility: CLINIC | Age: 71
End: 2021-04-26
Payer: MEDICARE

## 2021-04-26 VITALS
RESPIRATION RATE: 20 BRPM | HEART RATE: 82 BPM | WEIGHT: 258 LBS | TEMPERATURE: 98 F | HEIGHT: 62 IN | BODY MASS INDEX: 47.48 KG/M2 | DIASTOLIC BLOOD PRESSURE: 62 MMHG | SYSTOLIC BLOOD PRESSURE: 138 MMHG

## 2021-04-26 DIAGNOSIS — E66.01 CLASS 3 SEVERE OBESITY DUE TO EXCESS CALORIES WITHOUT SERIOUS COMORBIDITY WITH BODY MASS INDEX (BMI) OF 45.0 TO 49.9 IN ADULT (HCC): ICD-10-CM

## 2021-04-26 DIAGNOSIS — M51.36 DEGENERATIVE DISC DISEASE, LUMBAR: ICD-10-CM

## 2021-04-26 DIAGNOSIS — K63.9 ARTHRITIS ASSOCIATED WITH INFLAMMATORY BOWEL DISEASE: ICD-10-CM

## 2021-04-26 DIAGNOSIS — M07.60 ARTHRITIS ASSOCIATED WITH INFLAMMATORY BOWEL DISEASE: ICD-10-CM

## 2021-04-26 DIAGNOSIS — M48.061 SPINAL STENOSIS OF LUMBAR REGION WITHOUT NEUROGENIC CLAUDICATION: Primary | ICD-10-CM

## 2021-04-26 PROCEDURE — 99214 OFFICE O/P EST MOD 30 MIN: CPT | Performed by: INTERNAL MEDICINE

## 2021-04-26 RX ORDER — TRIAMTERENE AND HYDROCHLOROTHIAZIDE 37.5; 25 MG/1; MG/1
1 CAPSULE ORAL EVERY MORNING
Qty: 30 CAPSULE | Refills: 5 | Status: CANCELLED | OUTPATIENT
Start: 2021-04-26

## 2021-04-26 RX ORDER — CYCLOBENZAPRINE HCL 10 MG
TABLET ORAL
Qty: 90 TABLET | Refills: 3 | Status: SHIPPED | OUTPATIENT
Start: 2021-04-26 | End: 2021-10-26

## 2021-04-26 RX ORDER — GABAPENTIN 300 MG/1
300 CAPSULE ORAL 2 TIMES DAILY
Qty: 60 CAPSULE | Refills: 5 | Status: SHIPPED | OUTPATIENT
Start: 2021-04-26 | End: 2021-10-26 | Stop reason: WASHOUT

## 2021-04-26 RX ORDER — PREDNISONE 1 MG/1
5 TABLET ORAL 2 TIMES DAILY PRN
Qty: 120 TABLET | Refills: 5 | Status: SHIPPED | OUTPATIENT
Start: 2021-04-26 | End: 2021-10-26

## 2021-04-26 RX ORDER — TRAMADOL HYDROCHLORIDE 50 MG/1
TABLET ORAL EVERY 6 HOURS PRN
Qty: 100 TABLET | Refills: 5 | Status: SHIPPED | OUTPATIENT
Start: 2021-04-26 | End: 2021-10-26

## 2021-04-26 RX ORDER — ACETAMINOPHEN AND CODEINE PHOSPHATE 300; 30 MG/1; MG/1
1 TABLET ORAL EVERY 6 HOURS PRN
Qty: 100 TABLET | Refills: 2 | Status: SHIPPED | OUTPATIENT
Start: 2021-04-26 | End: 2021-05-21

## 2021-04-26 NOTE — PATIENT INSTRUCTIONS
For lumbar spinal stenosis you could go back to the [facility] for another back injection. For an opinion about possible back surgery see Dr. Bentley Murray of THE [facility] neurosurgery.   For now take tramadol 50 mg 1 every 6 hours as needed for back ryan

## 2021-04-26 NOTE — PROGRESS NOTES
[facility]  Dr. Lizzy Navas Progress Note     Subjective:   Saturnino Sanders is a(n) 70year old female. Current complaints: Patient presents with: Follow - Up: 6 month f/u. Was able to get injection in back.  That helped for a month and pain came back in

## 2021-07-15 ENCOUNTER — TELEPHONE (OUTPATIENT)
Dept: ORTHOPEDICS CLINIC | Facility: CLINIC | Age: 71
End: 2021-07-15

## 2021-07-15 DIAGNOSIS — M25.561 RIGHT KNEE PAIN, UNSPECIFIED CHRONICITY: Primary | ICD-10-CM

## 2021-07-15 NOTE — TELEPHONE ENCOUNTER
Please enter an Xray RX for a  RT KNEE  for  this patient’s upcoming appointment, as the patient has not had any imaging as of yet.   Patient was instructed to get X-rays before appt, so they will be calling [phone] to get scheduled before their appoin

## 2021-07-15 NOTE — TELEPHONE ENCOUNTER
• Reviewed patients chart, xray orders are required.  Order placed for Rt knee xrays  •   Future Appointments   Date Time Provider Vicenta Hortencia   8/17/2021  3:00 PM Kathia Saini MD [facility]   31/05/0226 71:28 PM Justice Monsalve

## 2021-08-02 ENCOUNTER — TELEPHONE (OUTPATIENT)
Dept: ORTHOPEDICS CLINIC | Facility: CLINIC | Age: 71
End: 2021-08-02

## 2021-08-02 DIAGNOSIS — M25.521 ELBOW PAIN, RIGHT: Primary | ICD-10-CM

## 2021-08-02 DIAGNOSIS — M25.571 ACUTE RIGHT ANKLE PAIN: ICD-10-CM

## 2021-08-02 NOTE — TELEPHONE ENCOUNTER
Spoke with patient who stated she fell on her right side at the [facility] last Friday. Pt hit her right ankle, knee, and elbow primarily. Pt states she's having pain, but it's manageable. Pt asked if she can be seen sooner for this.  Appt scheduled with Azeem

## 2021-08-04 ENCOUNTER — HOSPITAL ENCOUNTER (OUTPATIENT)
Dept: GENERAL RADIOLOGY | Age: 71
Discharge: HOME | End: 2021-08-04
Attending: ORTHOPAEDIC SURGERY
Payer: MEDICARE

## 2021-08-04 DIAGNOSIS — M25.571 ACUTE RIGHT ANKLE PAIN: ICD-10-CM

## 2021-08-04 DIAGNOSIS — M25.561 RIGHT KNEE PAIN, UNSPECIFIED CHRONICITY: ICD-10-CM

## 2021-08-04 DIAGNOSIS — M25.521 ELBOW PAIN, RIGHT: ICD-10-CM

## 2021-08-04 PROCEDURE — 73564 X-RAY EXAM KNEE 4 OR MORE: CPT | Performed by: ORTHOPAEDIC SURGERY

## 2021-08-04 PROCEDURE — 73080 X-RAY EXAM OF ELBOW: CPT | Performed by: ORTHOPAEDIC SURGERY

## 2021-08-04 PROCEDURE — 73610 X-RAY EXAM OF ANKLE: CPT | Performed by: ORTHOPAEDIC SURGERY

## 2021-08-17 ENCOUNTER — OFFICE VISIT (OUTPATIENT)
Dept: ORTHOPEDICS CLINIC | Facility: CLINIC | Age: 71
End: 2021-08-17
Payer: MEDICARE

## 2021-08-17 DIAGNOSIS — Z96.653 H/O TOTAL KNEE REPLACEMENT, BILATERAL: ICD-10-CM

## 2021-08-17 DIAGNOSIS — M48.062 LUMBAR STENOSIS WITH NEUROGENIC CLAUDICATION: ICD-10-CM

## 2021-08-17 DIAGNOSIS — M51.36 DEGENERATIVE DISC DISEASE, LUMBAR: Primary | ICD-10-CM

## 2021-08-17 PROCEDURE — 99213 OFFICE O/P EST LOW 20 MIN: CPT | Performed by: ORTHOPAEDIC SURGERY

## 2021-08-17 NOTE — PROGRESS NOTES
Patient is a 15-year-old white female here for evaluation of her back lower extremities. Patient's  is with her today. I have seen her in the past and have treated her for various problems. She has had bilateral total knee arthroplasties performed. will likely worsen. Patient understand that she will need to lose weight in order to minimize risks associated with any surgery. Follow-up with me to be open at this time. Did give her names of neurosurgeons and orthopedic back surgeons.

## 2021-09-09 ENCOUNTER — TELEPHONE (OUTPATIENT)
Dept: ORTHOPEDICS CLINIC | Facility: CLINIC | Age: 71
End: 2021-09-09

## 2021-09-09 NOTE — TELEPHONE ENCOUNTER
Spoke to patient and advised she can see any of the providers at [facility]. Patient verbalized understanding.

## 2021-09-09 NOTE — TELEPHONE ENCOUNTER
Patient states that  is no longer in the Neuroscience is there any other Doctor besides him? Please advise. Thanks.     Patient can be reached at [phone]

## 2021-10-01 ENCOUNTER — TELEPHONE (OUTPATIENT)
Dept: SURGERY | Facility: CLINIC | Age: 71
End: 2021-10-01

## 2021-10-01 NOTE — TELEPHONE ENCOUNTER
[facility] on home number Dr. Lianet Rehman pulled into surgery Monday afternoon. Appointment on Monday cancelled. Asked patient to call back to confirm appointment new date and time.   Tuesday 10/5/2021 @ 1pm

## 2021-10-05 ENCOUNTER — OFFICE VISIT (OUTPATIENT)
Dept: SURGERY | Facility: CLINIC | Age: 71
End: 2021-10-05
Payer: MEDICARE

## 2021-10-05 VITALS
HEIGHT: 62 IN | SYSTOLIC BLOOD PRESSURE: 140 MMHG | BODY MASS INDEX: 47.48 KG/M2 | DIASTOLIC BLOOD PRESSURE: 72 MMHG | WEIGHT: 258 LBS

## 2021-10-05 DIAGNOSIS — M53.3 SACROILIAC JOINT DYSFUNCTION OF BOTH SIDES: ICD-10-CM

## 2021-10-05 DIAGNOSIS — M47.816 LUMBAR SPONDYLOSIS: ICD-10-CM

## 2021-10-05 DIAGNOSIS — M54.59 MECHANICAL LOW BACK PAIN: Primary | ICD-10-CM

## 2021-10-05 PROCEDURE — 99204 OFFICE O/P NEW MOD 45 MIN: CPT | Performed by: NEUROLOGICAL SURGERY

## 2021-10-05 NOTE — PROGRESS NOTES
Ongoing back pain  No sx  Has had injections with Dr. Marcella Tyson  Did some PT    Left leg keeps swelling  Most of the time its one side or the other, not both at the same time  The longer she stands it causes pain  10-20 mins is about as long as she can stand

## 2021-10-05 NOTE — PROGRESS NOTES
[facility] 8  Neurological Surgery Clinic Note    Name: Vani Natarajan  : 1950  MRN: [id number]  PCP: Gumaro Mcginnis COMPLAINT:  Low back pain    HISTORY OF PRESENT ILLNESS:  Vani Natarajan is a 70year old female.   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 Walks with a cane  IMPRESSION:   70-year-old obese  female with a mechanical low back pain over 10 years. Pain is exacerbated with standing, walking, and going up and down the stairs. She did physical therapy which increased her pain. (3) Degenerative arthritis, also called osteoarthritis of the spine, causing degeneration of the sacroiliac joints, which can cause mild inflammation and pain    Sacroiliitis is commonly mistaken for other causes of low back pain including sciatica, james cataracts, weight gain and a round face. Corticosteroids are prescribed to relieve acute symptoms, with the goal of gradually tapering off the medication.     For people with sacroiliitis associated with ankylosing spondylitis, there is no definitive eviden

## 2021-10-05 NOTE — PATIENT INSTRUCTIONS
Refill policies:    • Allow 2-3 business days for refills; controlled substances may take longer.   • Contact your pharmacy at least 5 days prior to running out of medication and have them send an electronic request or submit request through the “request re Depending on your insurance carrier, approval may take 3-10 days. It is highly recommended patients contact their insurance carrier directly to determine coverage.   If test is done without insurance authorization, patient may be responsible for the entire connect the lower spine and pelvis. With sacroiliitis, even the slightest movements of the spine can be extremely uncomfortable or even painful for patients.       Sacroiliitis symptoms may include:   (1) pain and stiffness in the lower back, thighs or butt side effects may include damage to the liver and kidneys, and high blood pressure. Except for aspirin, NSAIDs may also increase the risk of cardiovascular events, such as heart attack or stroke.  The risk of side effects from these medications is higher in

## 2021-10-26 ENCOUNTER — OFFICE VISIT (OUTPATIENT)
Dept: RHEUMATOLOGY | Facility: CLINIC | Age: 71
End: 2021-10-26
Payer: MEDICARE

## 2021-10-26 VITALS
TEMPERATURE: 98 F | HEART RATE: 74 BPM | HEIGHT: 62 IN | OXYGEN SATURATION: 97 % | BODY MASS INDEX: 46.93 KG/M2 | DIASTOLIC BLOOD PRESSURE: 68 MMHG | SYSTOLIC BLOOD PRESSURE: 118 MMHG | WEIGHT: 255 LBS

## 2021-10-26 DIAGNOSIS — E66.01 CLASS 3 SEVERE OBESITY DUE TO EXCESS CALORIES WITHOUT SERIOUS COMORBIDITY WITH BODY MASS INDEX (BMI) OF 45.0 TO 49.9 IN ADULT (HCC): ICD-10-CM

## 2021-10-26 DIAGNOSIS — M48.061 SPINAL STENOSIS OF LUMBAR REGION WITHOUT NEUROGENIC CLAUDICATION: ICD-10-CM

## 2021-10-26 DIAGNOSIS — E11.9 TYPE 2 DIABETES MELLITUS WITHOUT COMPLICATION, WITHOUT LONG-TERM CURRENT USE OF INSULIN (HCC): ICD-10-CM

## 2021-10-26 DIAGNOSIS — M51.36 DEGENERATIVE DISC DISEASE, LUMBAR: Primary | ICD-10-CM

## 2021-10-26 PROCEDURE — 99214 OFFICE O/P EST MOD 30 MIN: CPT | Performed by: INTERNAL MEDICINE

## 2021-10-26 RX ORDER — PREDNISONE 1 MG/1
5 TABLET ORAL 2 TIMES DAILY PRN
Qty: 120 TABLET | Refills: 5 | Status: SHIPPED | OUTPATIENT
Start: 2021-10-26

## 2021-10-26 RX ORDER — ACETAMINOPHEN AND CODEINE PHOSPHATE 300; 30 MG/1; MG/1
1 TABLET ORAL EVERY 6 HOURS PRN
Qty: 100 TABLET | Refills: 3 | Status: SHIPPED | OUTPATIENT
Start: 2021-10-26 | End: 2021-11-25

## 2021-10-26 RX ORDER — GABAPENTIN 300 MG/1
300 CAPSULE ORAL 2 TIMES DAILY
Qty: 60 CAPSULE | Refills: 5 | Status: CANCELLED | OUTPATIENT
Start: 2021-10-26

## 2021-10-26 RX ORDER — ACETAMINOPHEN AND CODEINE PHOSPHATE 300; 30 MG/1; MG/1
1 TABLET ORAL EVERY 6 HOURS PRN
Qty: 100 TABLET | Refills: 0 | Status: CANCELLED | OUTPATIENT
Start: 2021-11-20 | End: 2021-12-15

## 2021-10-26 RX ORDER — ACETAMINOPHEN AND CODEINE PHOSPHATE 300; 30 MG/1; MG/1
1 TABLET ORAL EVERY 6 HOURS PRN
Qty: 100 TABLET | Refills: 0 | Status: CANCELLED | OUTPATIENT
Start: 2021-12-15 | End: 2022-01-09

## 2021-10-26 RX ORDER — GABAPENTIN 600 MG/1
600 TABLET ORAL 2 TIMES DAILY
Qty: 60 TABLET | Refills: 5 | Status: SHIPPED | OUTPATIENT
Start: 2021-10-26

## 2021-10-26 RX ORDER — TRAMADOL HYDROCHLORIDE 50 MG/1
50 TABLET ORAL EVERY 6 HOURS PRN
Qty: 100 TABLET | Refills: 5 | Status: SHIPPED | OUTPATIENT
Start: 2021-10-26

## 2021-10-26 RX ORDER — CYCLOBENZAPRINE HCL 10 MG
TABLET ORAL
Qty: 90 TABLET | Refills: 3 | Status: SHIPPED | OUTPATIENT
Start: 2021-10-26

## 2021-10-26 NOTE — PATIENT INSTRUCTIONS
For mild pain take extra strength Tylenol 500 mg once or twice a day. For moderate pain take tramadol 50 mg 1 or 2 a day. For severe pain take Tylenol 3 1 or 2 at bedtime.   Gabapentin has been increased from 300 mg twice a day to 600 mg twice a day for c

## 2021-10-26 NOTE — PROGRESS NOTES
[facility]  Dr. Irizarry Co Progress Note     Subjective:   Faby Pump is a(n) 70year old female. Current complaints: Patient presents with:   Follow - Up: LOV 4-26-21; pt states she's okay; saw Ortho, seeking 2nd opinion for back problem, PT and ph or 2 a day for inflammatory arthritis control. Return to office for recheck 6 months.         Jamin Forrester MD 73/16/9875 12:44 PM

## 2022-04-26 ENCOUNTER — TELEPHONE (OUTPATIENT)
Dept: RHEUMATOLOGY | Facility: CLINIC | Age: 72
End: 2022-04-26

## 2022-04-26 ENCOUNTER — OFFICE VISIT (OUTPATIENT)
Dept: RHEUMATOLOGY | Facility: CLINIC | Age: 72
End: 2022-04-26
Payer: MEDICARE

## 2022-04-26 VITALS
HEART RATE: 73 BPM | SYSTOLIC BLOOD PRESSURE: 132 MMHG | TEMPERATURE: 98 F | BODY MASS INDEX: 48.71 KG/M2 | OXYGEN SATURATION: 97 % | WEIGHT: 258 LBS | HEIGHT: 61 IN | DIASTOLIC BLOOD PRESSURE: 68 MMHG

## 2022-04-26 DIAGNOSIS — M48.061 SPINAL STENOSIS OF LUMBAR REGION WITHOUT NEUROGENIC CLAUDICATION: ICD-10-CM

## 2022-04-26 DIAGNOSIS — M51.36 DEGENERATIVE DISC DISEASE, LUMBAR: Primary | ICD-10-CM

## 2022-04-26 DIAGNOSIS — E66.01 CLASS 3 SEVERE OBESITY DUE TO EXCESS CALORIES WITHOUT SERIOUS COMORBIDITY WITH BODY MASS INDEX (BMI) OF 45.0 TO 49.9 IN ADULT (HCC): ICD-10-CM

## 2022-04-26 DIAGNOSIS — E11.9 TYPE 2 DIABETES MELLITUS WITHOUT COMPLICATION, WITHOUT LONG-TERM CURRENT USE OF INSULIN (HCC): ICD-10-CM

## 2022-04-26 PROBLEM — E11.65 TYPE 2 DIABETES MELLITUS WITH HYPERGLYCEMIA (HCC): Status: ACTIVE | Noted: 2022-04-26

## 2022-04-26 PROCEDURE — 99214 OFFICE O/P EST MOD 30 MIN: CPT | Performed by: INTERNAL MEDICINE

## 2022-04-26 RX ORDER — PREDNISONE 1 MG/1
5 TABLET ORAL DAILY
Qty: 90 TABLET | Refills: 1 | Status: SHIPPED | OUTPATIENT
Start: 2022-04-26 | End: 2022-07-25

## 2022-04-26 RX ORDER — CYCLOBENZAPRINE HCL 10 MG
TABLET ORAL
Qty: 90 TABLET | Refills: 5 | Status: SHIPPED | OUTPATIENT
Start: 2022-04-26

## 2022-04-26 RX ORDER — CYCLOBENZAPRINE HCL 10 MG
TABLET ORAL
Qty: 90 TABLET | Refills: 3 | Status: CANCELLED | OUTPATIENT
Start: 2022-04-26

## 2022-04-26 RX ORDER — GABAPENTIN 600 MG/1
600 TABLET ORAL 2 TIMES DAILY
Qty: 180 TABLET | Refills: 1 | Status: SHIPPED | OUTPATIENT
Start: 2022-04-26 | End: 2022-07-25

## 2022-04-26 RX ORDER — ACETAMINOPHEN AND CODEINE PHOSPHATE 300; 30 MG/1; MG/1
1 TABLET ORAL EVERY 6 HOURS PRN
Qty: 100 TABLET | Refills: 3 | Status: SHIPPED | OUTPATIENT
Start: 2022-04-26 | End: 2022-05-26

## 2022-04-26 RX ORDER — TRAMADOL HYDROCHLORIDE 50 MG/1
50 TABLET ORAL EVERY 6 HOURS PRN
Qty: 100 TABLET | Refills: 5 | Status: SHIPPED | OUTPATIENT
Start: 2022-04-26

## 2022-04-26 RX ORDER — ROSUVASTATIN CALCIUM 10 MG/1
10 TABLET, COATED ORAL NIGHTLY
COMMUNITY
Start: 2022-04-12

## 2022-04-26 NOTE — PATIENT INSTRUCTIONS
Use flexaril 10 mg up to 3 times per day as a muscle relaxant. For chronic arthritic pain use extra strength Tylenol 500 mg 1 or 2 twice a day as needed for mild pain. For more moderate pain during the day take tramadol 50 mg 1 3 times a day if needed. For severe pain at night take Tylenol 3 1 or 2 as needed. Consider visiting [facility] regarding your degenerative disc disease and spinal stenosis lumbar spine. They might want to do another spinal injection or a nerve ablation in your back. Try to lose some weight. Do some mild stretching exercises. Do some mild walking. Continue gabapentin daily. 600 mg twice a day. Return to office for recheck 6 months.

## 2022-04-26 NOTE — TELEPHONE ENCOUNTER
Vitamin B12 is very low. Need to start the injections that PCP has ordered. Called pt on cell to inform her of above; no mailbox set up. Called again on home #; lm on vm to cb. Main dept # given.

## 2022-05-04 ENCOUNTER — APPOINTMENT (OUTPATIENT)
Dept: CT IMAGING | Facility: HOSPITAL | Age: 72
End: 2022-05-04
Attending: EMERGENCY MEDICINE
Payer: MEDICARE

## 2022-05-04 ENCOUNTER — HOSPITAL ENCOUNTER (EMERGENCY)
Facility: HOSPITAL | Age: 72
Discharge: HOME | End: 2022-05-04
Attending: EMERGENCY MEDICINE
Payer: MEDICARE

## 2022-05-04 ENCOUNTER — TELEPHONE (OUTPATIENT)
Dept: RHEUMATOLOGY | Facility: CLINIC | Age: 72
End: 2022-05-04

## 2022-05-04 VITALS
DIASTOLIC BLOOD PRESSURE: 85 MMHG | HEIGHT: 62 IN | SYSTOLIC BLOOD PRESSURE: 157 MMHG | WEIGHT: 259 LBS | RESPIRATION RATE: 17 BRPM | HEART RATE: 81 BPM | BODY MASS INDEX: 47.66 KG/M2 | TEMPERATURE: 98 F | OXYGEN SATURATION: 95 %

## 2022-05-04 DIAGNOSIS — S09.90XA INJURY OF HEAD, INITIAL ENCOUNTER: Primary | ICD-10-CM

## 2022-05-04 PROCEDURE — 70450 CT HEAD/BRAIN W/O DYE: CPT | Performed by: EMERGENCY MEDICINE

## 2022-05-04 PROCEDURE — 90471 IMMUNIZATION ADMIN: CPT

## 2022-05-04 PROCEDURE — 72125 CT NECK SPINE W/O DYE: CPT | Performed by: EMERGENCY MEDICINE

## 2022-05-04 PROCEDURE — 99284 EMERGENCY DEPT VISIT MOD MDM: CPT

## 2022-05-04 PROCEDURE — 12011 RPR F/E/E/N/L/M 2.5 CM/<: CPT

## 2022-05-04 RX ORDER — HYDROCODONE BITARTRATE AND ACETAMINOPHEN 5; 325 MG/1; MG/1
1 TABLET ORAL EVERY 6 HOURS PRN
Qty: 15 TABLET | Refills: 0 | Status: SHIPPED | OUTPATIENT
Start: 2022-05-04 | End: 2022-05-09

## 2022-05-04 RX ORDER — ONDANSETRON 4 MG/1
4 TABLET, ORALLY DISINTEGRATING ORAL ONCE
Status: COMPLETED | OUTPATIENT
Start: 2022-05-04 | End: 2022-05-04

## 2022-05-04 RX ORDER — ONDANSETRON 4 MG/1
4 TABLET, ORALLY DISINTEGRATING ORAL EVERY 4 HOURS PRN
Qty: 20 TABLET | Refills: 0 | Status: SHIPPED | OUTPATIENT
Start: 2022-05-04 | End: 2022-05-09

## 2022-05-04 RX ORDER — HYDROCODONE BITARTRATE AND ACETAMINOPHEN 5; 325 MG/1; MG/1
2 TABLET ORAL ONCE
Status: COMPLETED | OUTPATIENT
Start: 2022-05-04 | End: 2022-05-04

## 2022-05-04 NOTE — TELEPHONE ENCOUNTER
Finally spoke to pt who states she started injections yesterday. She spoke to PCP and agrees to continue with B12 injections. Will update this office if any further problems.

## 2022-05-04 NOTE — ED INITIAL ASSESSMENT (HPI)
TRIPPED AND FELL DOWN ON THE PARKING LOT . HIT HER FACE ON THE GROUND THERE IS SWELLING AND SUPERFICIAL ABRASIONS ON LEFT SIDE OF THE FOREHEAD. NO LOSS OF CONSCIOUSNESS. PATIENT IS ON BLOOD THINNER.  ALERT AND AWAKE ORIENTED X4

## 2022-08-30 ENCOUNTER — HOSPITAL ENCOUNTER (EMERGENCY)
Facility: HOSPITAL | Age: 72
Discharge: LEFT BEFORE DISCHARGE | End: 2022-08-30
Payer: MEDICARE

## 2022-08-30 VITALS
BODY MASS INDEX: 45 KG/M2 | WEIGHT: 246 LBS | OXYGEN SATURATION: 99 % | TEMPERATURE: 97 F | RESPIRATION RATE: 14 BRPM | DIASTOLIC BLOOD PRESSURE: 77 MMHG | HEART RATE: 83 BPM | SYSTOLIC BLOOD PRESSURE: 137 MMHG

## 2022-10-26 ENCOUNTER — OFFICE VISIT (OUTPATIENT)
Dept: RHEUMATOLOGY | Facility: CLINIC | Age: 72
End: 2022-10-26
Payer: MEDICARE

## 2022-10-26 VITALS
SYSTOLIC BLOOD PRESSURE: 130 MMHG | OXYGEN SATURATION: 94 % | WEIGHT: 259 LBS | BODY MASS INDEX: 47.66 KG/M2 | HEIGHT: 62 IN | DIASTOLIC BLOOD PRESSURE: 80 MMHG | TEMPERATURE: 97 F | HEART RATE: 92 BPM

## 2022-10-26 DIAGNOSIS — E11.9 TYPE 2 DIABETES MELLITUS WITHOUT COMPLICATION, WITHOUT LONG-TERM CURRENT USE OF INSULIN (HCC): ICD-10-CM

## 2022-10-26 DIAGNOSIS — E66.01 CLASS 3 SEVERE OBESITY DUE TO EXCESS CALORIES WITHOUT SERIOUS COMORBIDITY WITH BODY MASS INDEX (BMI) OF 45.0 TO 49.9 IN ADULT (HCC): ICD-10-CM

## 2022-10-26 DIAGNOSIS — K63.9 ARTHRITIS ASSOCIATED WITH INFLAMMATORY BOWEL DISEASE: ICD-10-CM

## 2022-10-26 DIAGNOSIS — M48.061 SPINAL STENOSIS OF LUMBAR REGION WITHOUT NEUROGENIC CLAUDICATION: Primary | ICD-10-CM

## 2022-10-26 DIAGNOSIS — M51.36 DEGENERATIVE DISC DISEASE, LUMBAR: ICD-10-CM

## 2022-10-26 DIAGNOSIS — M07.60 ARTHRITIS ASSOCIATED WITH INFLAMMATORY BOWEL DISEASE: ICD-10-CM

## 2022-10-26 PROCEDURE — 99214 OFFICE O/P EST MOD 30 MIN: CPT | Performed by: INTERNAL MEDICINE

## 2022-10-26 RX ORDER — GABAPENTIN 600 MG/1
600 TABLET ORAL 2 TIMES DAILY
Qty: 60 TABLET | Refills: 5 | Status: SHIPPED | OUTPATIENT
Start: 2022-10-26

## 2022-10-26 RX ORDER — TRAMADOL HYDROCHLORIDE 50 MG/1
50 TABLET ORAL EVERY 6 HOURS PRN
Qty: 100 TABLET | Refills: 5 | Status: SHIPPED | OUTPATIENT
Start: 2022-10-26

## 2022-10-26 RX ORDER — PREDNISONE 1 MG/1
5 TABLET ORAL DAILY
COMMUNITY
End: 2022-10-26

## 2022-10-26 RX ORDER — ACETAMINOPHEN AND CODEINE PHOSPHATE 300; 30 MG/1; MG/1
1 TABLET ORAL NIGHTLY PRN
Qty: 30 TABLET | Refills: 3 | Status: SHIPPED | OUTPATIENT
Start: 2022-10-26

## 2022-10-26 RX ORDER — DEXLANSOPRAZOLE 30 MG/1
30 CAPSULE, DELAYED RELEASE ORAL DAILY
Qty: 30 CAPSULE | Refills: 5 | Status: SHIPPED | OUTPATIENT
Start: 2022-10-26

## 2022-10-26 RX ORDER — CYCLOBENZAPRINE HCL 10 MG
TABLET ORAL
Qty: 90 TABLET | Refills: 5 | Status: SHIPPED | OUTPATIENT
Start: 2022-10-26

## 2022-10-26 RX ORDER — PREDNISONE 1 MG/1
5 TABLET ORAL DAILY
Qty: 90 TABLET | Refills: 1 | Status: SHIPPED | OUTPATIENT
Start: 2022-10-26

## 2022-10-26 NOTE — PATIENT INSTRUCTIONS
Tramadol for pain 50 mg as needed every 8 hours. Tylenol #3 at night as needed. Flexaril 10 mg for muscle cramps as needed. Gabapentin 600 mg twice a day for pain. Prednisone 5 mg as needed one per day. Dexilant 30 mg per day for stomach issues. RTO  6 months.

## 2023-03-31 ENCOUNTER — OFFICE VISIT (OUTPATIENT)
Facility: LOCATION | Age: 73
End: 2023-03-31
Payer: MEDICARE

## 2023-03-31 DIAGNOSIS — G47.33 OSA (OBSTRUCTIVE SLEEP APNEA): ICD-10-CM

## 2023-03-31 DIAGNOSIS — H61.23 BILATERAL IMPACTED CERUMEN: Primary | ICD-10-CM

## 2023-03-31 PROCEDURE — 99214 OFFICE O/P EST MOD 30 MIN: CPT | Performed by: OTOLARYNGOLOGY

## 2023-03-31 PROCEDURE — 92504 EAR MICROSCOPY EXAMINATION: CPT | Performed by: OTOLARYNGOLOGY

## 2023-04-26 ENCOUNTER — OFFICE VISIT (OUTPATIENT)
Dept: RHEUMATOLOGY | Facility: CLINIC | Age: 73
End: 2023-04-26
Payer: MEDICARE

## 2023-04-26 VITALS
BODY MASS INDEX: 43.06 KG/M2 | DIASTOLIC BLOOD PRESSURE: 76 MMHG | HEART RATE: 101 BPM | RESPIRATION RATE: 16 BRPM | HEIGHT: 62 IN | OXYGEN SATURATION: 97 % | SYSTOLIC BLOOD PRESSURE: 130 MMHG | WEIGHT: 234 LBS

## 2023-04-26 DIAGNOSIS — M51.36 DEGENERATIVE DISC DISEASE, LUMBAR: ICD-10-CM

## 2023-04-26 DIAGNOSIS — M48.061 SPINAL STENOSIS OF LUMBAR REGION WITHOUT NEUROGENIC CLAUDICATION: Primary | ICD-10-CM

## 2023-04-26 DIAGNOSIS — E11.65 TYPE 2 DIABETES MELLITUS WITH HYPERGLYCEMIA, WITHOUT LONG-TERM CURRENT USE OF INSULIN (HCC): ICD-10-CM

## 2023-04-26 PROCEDURE — 99214 OFFICE O/P EST MOD 30 MIN: CPT | Performed by: INTERNAL MEDICINE

## 2023-04-26 RX ORDER — PYRAZINAMIDE 500 MG/1
1 TABLET ORAL 2 TIMES DAILY PRN
Qty: 60 TABLET | Refills: 3 | Status: SHIPPED | OUTPATIENT
Start: 2023-04-26

## 2023-04-26 RX ORDER — CYCLOBENZAPRINE HCL 10 MG
TABLET ORAL
Qty: 90 TABLET | Refills: 5 | Status: SHIPPED | OUTPATIENT
Start: 2023-04-26

## 2023-04-26 RX ORDER — LIDOCAINE 50 MG/G
2 PATCH TOPICAL EVERY 24 HOURS
Qty: 60 PATCH | Refills: 3 | Status: SHIPPED | OUTPATIENT
Start: 2023-04-26

## 2023-04-26 RX ORDER — PREDNISONE 5 MG/1
5 TABLET ORAL DAILY
Qty: 90 TABLET | Refills: 1 | Status: SHIPPED | OUTPATIENT
Start: 2023-04-26

## 2023-04-26 RX ORDER — TRAMADOL HYDROCHLORIDE 50 MG/1
50 TABLET ORAL EVERY 6 HOURS PRN
Qty: 100 TABLET | Refills: 0 | Status: SHIPPED | OUTPATIENT
Start: 2023-04-26

## 2023-04-26 RX ORDER — OMEPRAZOLE 20 MG/1
20 CAPSULE, DELAYED RELEASE ORAL
COMMUNITY

## 2023-04-26 NOTE — PATIENT INSTRUCTIONS
Stop gabapentin - it makes you forgetful and foggy feeling. Use tramadol for mild pain on every 6  hours, or ES Tylenol as needed. .  Use tylenol # 3  - 1-2 per day for mor e severe pain. Flexaril 10 mg as needed for muscle spasm. Lidocaine patches - 1-2 per day place on painful spots. Prednisone 5 mg per day. Humoira 40 mg every 2 weeks. Return to office 6 months.

## 2023-08-03 RX ORDER — TRAMADOL HYDROCHLORIDE 50 MG/1
TABLET ORAL
Qty: 100 TABLET | Refills: 1 | Status: SHIPPED | OUTPATIENT
Start: 2023-08-03

## 2023-08-03 NOTE — TELEPHONE ENCOUNTER
Future Appointments   Date Time Provider Vicenta Hortencia   78/24/6158 79:56 AM Michael Hinson MD [facility]     Last office visit: 4/26/2023    Last fill: 4/26/2023 100 tab, 0 refill

## 2023-08-20 NOTE — TELEPHONE ENCOUNTER
Future Appointments   Date Time Provider Vicenta Hortencia   8/17/2021  3:00 PM Clay Oden MD [facility]     · Pt is scheduled to come in on 8/17  · Pt recently fell again this past Friday (7/30)  · She states she has really done a num 2 = difficulty understanding and speaking (not related to language barrier)

## 2023-10-16 RX ORDER — PREDNISONE 5 MG/1
5 TABLET ORAL DAILY
Qty: 90 TABLET | Refills: 1 | Status: SHIPPED | OUTPATIENT
Start: 2023-10-16

## 2023-10-16 NOTE — TELEPHONE ENCOUNTER
Future Appointments   Date Time Provider Vicenta Burnett   86/94/3069 80:59 AM Laura Cervantes MD [facility]     LOV  4/26/2023    Last refill  4/26/2023  90 tabs, 1 refill

## 2023-10-25 ENCOUNTER — OFFICE VISIT (OUTPATIENT)
Dept: RHEUMATOLOGY | Facility: CLINIC | Age: 73
End: 2023-10-25

## 2023-10-25 VITALS
HEIGHT: 61 IN | BODY MASS INDEX: 41.16 KG/M2 | RESPIRATION RATE: 16 BRPM | WEIGHT: 218 LBS | DIASTOLIC BLOOD PRESSURE: 74 MMHG | TEMPERATURE: 98 F | OXYGEN SATURATION: 95 % | SYSTOLIC BLOOD PRESSURE: 132 MMHG | HEART RATE: 72 BPM

## 2023-10-25 DIAGNOSIS — E66.01 CLASS 3 SEVERE OBESITY DUE TO EXCESS CALORIES WITHOUT SERIOUS COMORBIDITY WITH BODY MASS INDEX (BMI) OF 40.0 TO 44.9 IN ADULT (HCC): ICD-10-CM

## 2023-10-25 DIAGNOSIS — M51.36 DEGENERATIVE DISC DISEASE, LUMBAR: ICD-10-CM

## 2023-10-25 DIAGNOSIS — M48.061 SPINAL STENOSIS OF LUMBAR REGION WITHOUT NEUROGENIC CLAUDICATION: Primary | ICD-10-CM

## 2023-10-25 PROCEDURE — 99214 OFFICE O/P EST MOD 30 MIN: CPT | Performed by: INTERNAL MEDICINE

## 2023-10-25 RX ORDER — ACETAMINOPHEN AND CODEINE PHOSPHATE 300; 30 MG/1; MG/1
1 TABLET ORAL 3 TIMES DAILY PRN
Qty: 90 TABLET | Refills: 1 | Status: SHIPPED | OUTPATIENT
Start: 2023-10-25

## 2023-10-25 RX ORDER — METOPROLOL SUCCINATE 50 MG/1
50 TABLET, EXTENDED RELEASE ORAL DAILY
COMMUNITY
Start: 2023-10-19

## 2023-10-25 RX ORDER — PREDNISONE 5 MG/1
5 TABLET ORAL DAILY
Qty: 90 TABLET | Refills: 1 | Status: SHIPPED | OUTPATIENT
Start: 2023-10-25

## 2023-10-25 RX ORDER — SOLIFENACIN SUCCINATE 5 MG/1
5 TABLET, FILM COATED ORAL DAILY
COMMUNITY
Start: 2023-10-19

## 2023-10-25 RX ORDER — LIDOCAINE 50 MG/G
2 PATCH TOPICAL EVERY 24 HOURS
Qty: 60 PATCH | Refills: 3 | Status: SHIPPED | OUTPATIENT
Start: 2023-10-25

## 2023-10-25 RX ORDER — TRAMADOL HYDROCHLORIDE 50 MG/1
50 TABLET ORAL EVERY 6 HOURS PRN
Qty: 120 TABLET | Refills: 2 | Status: SHIPPED | OUTPATIENT
Start: 2023-10-25

## 2023-10-25 RX ORDER — CYCLOBENZAPRINE HCL 10 MG
TABLET ORAL
Qty: 90 TABLET | Refills: 5 | Status: SHIPPED | OUTPATIENT
Start: 2023-10-25

## 2023-10-25 RX ORDER — TRIAMCINOLONE ACETONIDE 1 MG/G
OINTMENT TOPICAL
COMMUNITY
Start: 2023-07-26

## 2023-10-25 NOTE — PATIENT INSTRUCTIONS
For mild pain use extra strength Tylenol 500 mg 1 or 2 a day. More moderate pain take tramadol 50 mg 1 or 2 a day. For severe pain take an occasional Tylenol 3 1 or 2 a day. You have degenerative arthritis and degenerative disc disease of the lumbar spine which causes your back pain and pinches nerves. Walk as best you can. If the pain worsens you can try physical therapy. Cyclobenzaprine is your backup medicine for muscle spasm. Lidoderm patches 1 or 2 a day can be used on painful spots in your back. Lidoderm patches are 12 hours on and 12 hours off. Return to office for recheck 6 months.

## 2024-04-25 ENCOUNTER — TELEPHONE (OUTPATIENT)
Dept: RHEUMATOLOGY | Facility: CLINIC | Age: 74
End: 2024-04-25

## 2024-04-25 ENCOUNTER — OFFICE VISIT (OUTPATIENT)
Dept: RHEUMATOLOGY | Facility: CLINIC | Age: 74
End: 2024-04-25
Payer: MEDICARE

## 2024-04-25 VITALS
SYSTOLIC BLOOD PRESSURE: 136 MMHG | TEMPERATURE: 98 F | OXYGEN SATURATION: 95 % | HEART RATE: 86 BPM | DIASTOLIC BLOOD PRESSURE: 70 MMHG | RESPIRATION RATE: 22 BRPM | BODY MASS INDEX: 41 KG/M2 | WEIGHT: 218 LBS

## 2024-04-25 DIAGNOSIS — I87.2 VENOUS INSUFFICIENCY OF BOTH LOWER EXTREMITIES: ICD-10-CM

## 2024-04-25 DIAGNOSIS — M51.36 DEGENERATIVE DISC DISEASE, LUMBAR: ICD-10-CM

## 2024-04-25 DIAGNOSIS — K63.9 ARTHRITIS ASSOCIATED WITH INFLAMMATORY BOWEL DISEASE: ICD-10-CM

## 2024-04-25 DIAGNOSIS — E11.9 TYPE 2 DIABETES MELLITUS WITHOUT COMPLICATION, WITHOUT LONG-TERM CURRENT USE OF INSULIN (HCC): ICD-10-CM

## 2024-04-25 DIAGNOSIS — M07.60 ARTHRITIS ASSOCIATED WITH INFLAMMATORY BOWEL DISEASE: ICD-10-CM

## 2024-04-25 DIAGNOSIS — K50.10 CROHN'S DISEASE OF COLON WITHOUT COMPLICATION (HCC): ICD-10-CM

## 2024-04-25 DIAGNOSIS — M48.061 SPINAL STENOSIS OF LUMBAR REGION WITHOUT NEUROGENIC CLAUDICATION: Primary | ICD-10-CM

## 2024-04-25 PROCEDURE — 99214 OFFICE O/P EST MOD 30 MIN: CPT | Performed by: INTERNAL MEDICINE

## 2024-04-25 RX ORDER — TRIAMTERENE AND HYDROCHLOROTHIAZIDE 37.5; 25 MG/1; MG/1
1 CAPSULE ORAL EVERY MORNING
Qty: 30 CAPSULE | Refills: 5 | Status: SHIPPED | OUTPATIENT
Start: 2024-04-25

## 2024-04-25 RX ORDER — PREDNISONE 5 MG/1
5 TABLET ORAL DAILY
Qty: 180 TABLET | Refills: 0 | Status: SHIPPED | OUTPATIENT
Start: 2024-04-25

## 2024-04-25 RX ORDER — TRAMADOL HYDROCHLORIDE 50 MG/1
50 TABLET ORAL EVERY 6 HOURS PRN
Qty: 120 TABLET | Refills: 2 | Status: SHIPPED | OUTPATIENT
Start: 2024-04-25

## 2024-04-25 RX ORDER — ACETAMINOPHEN AND CODEINE PHOSPHATE 300; 30 MG/1; MG/1
1 TABLET ORAL 3 TIMES DAILY PRN
Qty: 90 TABLET | Refills: 3 | Status: SHIPPED | OUTPATIENT
Start: 2024-04-25

## 2024-04-25 RX ORDER — LIDOCAINE 50 MG/G
2 PATCH TOPICAL EVERY 24 HOURS
Qty: 60 EACH | Refills: 3 | Status: SHIPPED | OUTPATIENT
Start: 2024-04-25

## 2024-04-25 NOTE — PROGRESS NOTES
[facility]  Dr. Cavazos Progress Note     Subjective:   Néstor Plaza is a(n) 74 year old female.   Current complaints:   Chief Complaint   Patient presents with    Follow - Up     6 month f/u. Pt states pain has remained the same since LOV.    Chronic lumbar spinal stenosis, DDD lumbar spine.    History of diabetes mellitus and Crohn's.    Has her ups and downs.  Pain feet, toes, hands, and back.    Left leg swells. Both legs swell left more than right.    Using tramadol for daytime pain and Tylenol 3 for nighttime pain.  Uses occasional Lidoderm patch on her back.  Taking prednisone 5 mg a day for arthritis associated with Crohn's disease.  No major stomach issues present time.  Objective:   /70   Pulse 86   Temp 98.2 °F (36.8 °C)   Resp 22   Wt 218 lb (98.9 kg)   SpO2 95%   BMI 41.19 kg/m²   Lungs clear  Heart nsr  Abd obese.  Back trace tender.   Legs edema L > r  Left lower leg mild erythema over distal lower lef that is 1+ swollen.   2020  MRI LUMBAR Spine - multilevel degenerative disc disease with spinal canal stenosis.    Assessment:     Encounter Diagnoses   Name Primary?    Spinal stenosis of lumbar region without neurogenic claudication Yes    Degenerative disc disease, lumbar     Arthritis associated with inflammatory bowel disease     Crohn's disease of colon without complication (HCC)     Type 2 diabetes mellitus without complication, without long-term current use of insulin (HCC)     Venous insufficiency of both lower extremities      Plan:     Patient Instructions   Bilateral leg swelling, left worse than right.   You have venous insufficiency - a circulation issue in the lower extremities.  Avoid salt.  Wear compression stockings.  I will add a water pill Dyazide to take 1 a day when your legs are swollen.  Keep your legs elevated when watching TV.  Use tramadol 50 mg 1 or 2 a day for chronic arthritis pain.  At night take Tylenol 3 1 or 2 to help with nighttime pain.  Prednisone  is 5 mg once a day for your Crohn's disease.  See your Crohn's doctor for reevaluation, your GI specialist.  Return to office for recheck 6 months.        Walter Cavazos MD 4/25/2024 12:31 PM

## 2024-04-25 NOTE — TELEPHONE ENCOUNTER
Called pharmacy and informed them that Dr. Cavazos is aware of the interaction and its ok to dispense. Pharmacist verbalized understanding.

## 2024-04-25 NOTE — PATIENT INSTRUCTIONS
Bilateral leg swelling, left worse than right.   You have venous insufficiency - a circulation issue in the lower extremities.  Avoid salt.  Wear compression stockings.  I will add a water pill Dyazide to take 1 a day when your legs are swollen.  Keep your legs elevated when watching TV.  Use tramadol 50 mg 1 or 2 a day for chronic arthritis pain.  At night take Tylenol 3 1 or 2 to help with nighttime pain.  Prednisone is 5 mg once a day for your Crohn's disease.  See your Crohn's doctor for reevaluation, your GI specialist.  Return to office for recheck 6 months.

## 2024-04-26 ENCOUNTER — TELEPHONE (OUTPATIENT)
Dept: RHEUMATOLOGY | Facility: CLINIC | Age: 74
End: 2024-04-26

## 2024-05-03 ENCOUNTER — TELEPHONE (OUTPATIENT)
Dept: RHEUMATOLOGY | Facility: CLINIC | Age: 74
End: 2024-05-03

## 2024-05-21 ENCOUNTER — OFFICE VISIT (OUTPATIENT)
Facility: LOCATION | Age: 74
End: 2024-05-21

## 2024-05-21 DIAGNOSIS — J32.0 SINUSITIS, MAXILLARY, CHRONIC: Primary | ICD-10-CM

## 2024-05-21 DIAGNOSIS — R49.0 HOARSENESS: ICD-10-CM

## 2024-05-21 PROCEDURE — 99214 OFFICE O/P EST MOD 30 MIN: CPT | Performed by: OTOLARYNGOLOGY

## 2024-05-21 PROCEDURE — 31575 DIAGNOSTIC LARYNGOSCOPY: CPT | Performed by: OTOLARYNGOLOGY

## 2024-05-21 RX ORDER — CEFUROXIME AXETIL 250 MG/1
250 TABLET ORAL 2 TIMES DAILY
Qty: 20 TABLET | Refills: 0 | Status: SHIPPED | OUTPATIENT
Start: 2024-05-21

## 2024-05-21 RX ORDER — FLUTICASONE PROPIONATE 50 MCG
2 SPRAY, SUSPENSION (ML) NASAL DAILY
Qty: 16 G | Refills: 3 | Status: SHIPPED | OUTPATIENT
Start: 2024-05-21

## 2024-05-21 NOTE — PROGRESS NOTES
Néstor Plaza is a 74 year old female. No chief complaint on file.    HPI:   Longstanding patient seen for sleep apnea wax etc.  Having hoarseness after a cold she has a smoking history.  Recently had an infected upper molar tooth removed was treated with clindamycin and still having some drainage issues and hoarseness  Current Outpatient Medications   Medication Sig Dispense Refill    predniSONE 5 MG Oral Tab Take 1 tablet (5 mg total) by mouth daily. 180 tablet 0    lidocaine 5 % External Patch Place 2 patches onto the skin daily. 60 each 3    traMADol 50 MG Oral Tab Take 1 tablet (50 mg total) by mouth every 6 (six) hours as needed for Pain. 120 tablet 2    acetaminophen-codeine (TYLENOL WITH CODEINE #3) 300-30 MG Oral Tab Take 1 tablet by mouth 3 (three) times daily as needed for Pain. 90 tablet 3    triamterene-hydroCHLOROthiazide 37.5-25 MG Oral Cap Take 1 capsule by mouth every morning. 30 capsule 5    metoprolol succinate ER 50 MG Oral Tablet 24 Hr Take 1 tablet (50 mg total) by mouth daily.      Solifenacin Succinate 5 MG Oral Tab Take 1 tablet (5 mg total) by mouth daily.      triamcinolone 0.1 % External Ointment Apply a thin layer to affected area on the left elbow once or twice daily for up to 2 weeks // 1 week off, repeat as needed until skin is smooth      cyclobenzaprine 10 MG Oral Tab TAKE 1 TABLET BY MOUTH THREE TIMES DAILY AS NEEDED FOR MUSCLE RELAXER 90 tablet 5    traMADol 50 MG Oral Tab Take 1 tablet (50 mg total) by mouth every 6 (six) hours as needed for Pain. 120 tablet 2    predniSONE 5 MG Oral Tab Take 1 tablet (5 mg total) by mouth daily. 90 tablet 1    lidocaine 5 % External Patch Place 2 patches onto the skin daily. 60 patch 3    omeprazole 20 MG Oral Capsule Delayed Release Take 1 capsule (20 mg total) by mouth every morning before breakfast.      rosuvastatin 10 MG Oral Tab Take 1 tablet (10 mg total) by mouth nightly.      Adalimumab (HUMIRA PEN) 40 MG/0.4ML Subcutaneous  Pen-injector Kit INJECT 40 MG UNDER THE SKIN (SUBCUTANEOUS INJECTION) EVERY 2 WEEKS. Must have yearly office visit for further refills. 1 each 0    diphenoxylate-atropine 2.5-0.025 MG Oral Tab Take 2 tablets by mouth 2 (two) times daily as needed for Diarrhea. 180 tablet 3    clotrimazole 1 % External Cream MISAEL 1 APPLICATION EXT AA BID FOR 30 DAYS      fluconazole 150 MG Oral Tab Take 1 tablet (150 mg total) by mouth.      Fluticasone Propionate 50 MCG/ACT Nasal Suspension 1 sprays per nostril twice daily x at least 2 wks. or until all symptoms are gone      Levocetirizine Dihydrochloride 5 MG Oral Tab Take 1 tablet (5 mg total) by mouth daily.      cyanocobalamin 1000 MCG/ML Injection Solution       escitalopram 20 MG Oral Tab Take 1 tablet (20 mg total) by mouth daily. 90 tablet 2    LORazepam 0.5 MG Oral Tab Take 1 tablet (0.5 mg total) by mouth every 8 (eight) hours as needed for Anxiety. 90 tablet 2    BD INTEGRA SYRINGE 25G X 1\" 3 ML Does not apply Misc INJECT 1 ML INTO THE MUSCLE Q 14 DAYS  0    glimepiride 2 MG Oral Tab TK 1 T PO BID  3    KRISTINA DISP NEEDLES 25G X 1-1/2\" Does not apply Misc INJECT INTO THE SKIN Q 14 DAYS  0    ergocalciferol 86101 UNITS Oral Cap TK 1 C PO TWICE A WEEK  0    Fenofibrate 160 MG Oral Tab TK 1 T PO  Morning  1    Ondansetron HCl (ZOFRAN) 8 MG Oral Tab Take 1 tablet by mouth every 8 (eight) hours as needed for Nausea. 30 tablet 0    Acidophilus/Pectin (PROBIOTIC) Oral Cap Take 1 capsule by mouth as needed.      AmLODIPine Besylate (NORVASC) 5 MG Oral Tab Take 1 tablet (5 mg total) by mouth daily.      albuterol 108 (90 Base) MCG/ACT Inhalation Aero Soln Inhale 2 puffs into the lungs every 6 (six) hours as needed.      metFORMIN HCl  MG Oral Tablet 24 Hr Take 2 tablets (1,000 mg total) by mouth 2 (two) times daily with meals.      Lisinopril 40 MG Oral Tab 1 TABLET DAILY      Montelukast Sodium (SINGULAIR) 10 MG Oral Tab 1 TABLET EVERY EVENING      Multiple Vitamin (DAILY  VITAMIN OR) None Entered        Past Medical History:    Anemia    Borderline    Anxiety    Arthritis    COPD (chronic obstructive pulmonary disease) (HCC)    Crohn disease (HCC)    Crohn's disease (HCC)    Depression    Diverticulitis    Esophageal reflux    HIGH BLOOD PRESSURE    HIGH CHOLESTEROL    HPV in female    Kidney cysts    2012    Liver disease    fatty liver    Osteopenia    PONV (postoperative nausea and vomiting)    Sleep apnea    STROKE    TIA    Stroke (HCC)    TIA (transient ischemic attack)    Type II or unspecified type diabetes mellitus without mention of complication, not stated as uncontrolled    Vasculitis (HCC)    Visual impairment    glasses      Social History:  Social History     Socioeconomic History    Marital status:    Tobacco Use    Smoking status: Every Day     Current packs/day: 1.20     Average packs/day: 1.2 packs/day for 40.0 years (48.0 ttl pk-yrs)     Types: Cigarettes    Smokeless tobacco: Never   Vaping Use    Vaping status: Never Used   Substance and Sexual Activity    Alcohol use: Yes     Alcohol/week: 0.0 standard drinks of alcohol     Comment: rare    Drug use: No   Other Topics Concern    Caffeine Concern Yes    Exercise No      Past Surgical History:   Procedure Laterality Date    Appendectomy      Appendectomy            x 1    Cholecystectomy      Colectomy      Colonoscopy  2013    Procedure: COLONOSCOPY;  Surgeon: Sampson Pinto MD;  Location:  ENDOSCOPY    Colonoscopy N/A 7/3/2019    Procedure: COLONOSCOPY;  Surgeon: Sampson Pinto MD;  Location:  ENDOSCOPY    Diagnostic anoscopy  2004    colonoscopy    Eye surgery      Orthopedic surg ([facility])      bilateral arthroscopy of knees    Tonsillectomy      Total knee replacement      bilateral         REVIEW OF SYSTEMS:   GENERAL HEALTH: feels well otherwise  GENERAL : denies fever, chills, sweats, weight loss, weight gain  SKIN: denies any unusual skin lesions or  rashes  RESPIRATORY: denies shortness of breath with exertion  NEURO: denies headaches    EXAM:   There were no vitals taken for this visit.    System Pertinent findings Details   Constitutional  Overall appearance - Normal.   Head/Face  Facial features -- Normal. Skull - Normal.   Eyes  Pupils equal ,round ,react to light and accomidate   Ears  External Ear Right: Normal, Left: Normal. Canal - Right: Normal, Left: Normal. TM - Right: Normal left: Normal   Nose  External Nose, Normal, Septum -midline,Nasal Vault, clear. Turbinates - Right: Normal left: Normal   Mouth/Throat  Lips/teeth/gums - Normal. Tonsils -0+ oropharynx - Normal.   Neck Exam  Inspection - Normal. Palpation - Normal. Parotid gland - Normal. Thyroid gland -normal   Lymph Detail  Submental. Submandibular. Anterior cervical. Posterior cervical. Supraclavicular.     Flexible Laryngoscopy Procedure Note (38471)    Due to inability for adequate examination of the larynx and need for magnification to perform the examination, endoscopy was performed.  Risks and benefits were discussed with patient/family and they have given verbal consent to proceed.    Pre-operative Diagnosis:   1. Sinusitis, maxillary, chronic    2. Hoarseness        Post-operative Diagnosis: Same    Procedure: Diagnostic flexible fiberoptic laryngoscopy    Anesthesia: Topical anesthetic Pioneer     Surgeon Elfego Simpson MD    EBL: 0cc    Procedure Detail & Findings: The patient was topically anesthetized within the nose, bilaterally.  The flexible laryngoscope was placed through the nostrils bilaterally.  The findings within the nose normal.  The findings within the nasopharynx normal.  The findings within the hypopharynx yellowish phlegm.  The findings within the larynx normal vocal cord mobility yellowish phlegm mainly from sinus        .    Condition: Stable    Complications: Patient tolerated the procedure well with no immediate complication.    Elfego Rosenbaum MD      ASSESSMENT  AND PLAN:   1. Sinusitis, maxillary, chronic  Ceftin 250 mg 1 twice daily #20  Flonase nasal spray  Follow-up 2 to 3 weeks for repeat Ha flexible laryngoscopy    2. Hoarseness  As above  Note patient is allergic to penicillin states she can take Ceftin has taken it in the past.    The patient indicates understanding of these issues and agrees to the plan.      Elfego Simpson MD  5/21/2024  12:31 PM

## 2024-06-02 NOTE — TELEPHONE ENCOUNTER
Results are in Dr. Leah Sutton, I will ask for him to review and make recommendation when he is in the office tomorrow.   Thank you Never smoker

## 2024-06-12 ENCOUNTER — OFFICE VISIT (OUTPATIENT)
Facility: LOCATION | Age: 74
End: 2024-06-12
Payer: MEDICARE

## 2024-06-12 DIAGNOSIS — J32.0 SINUSITIS, MAXILLARY, CHRONIC: Primary | ICD-10-CM

## 2024-06-12 PROCEDURE — 31575 DIAGNOSTIC LARYNGOSCOPY: CPT | Performed by: OTOLARYNGOLOGY

## 2024-06-12 PROCEDURE — 99214 OFFICE O/P EST MOD 30 MIN: CPT | Performed by: OTOLARYNGOLOGY

## 2024-06-12 NOTE — PROGRESS NOTES
Néstor Plaza is a 74 year old female. No chief complaint on file.    HPI:   74-year-old white female longstanding patient last visit she had some hoarseness and extensive drainage down the back of the throat from the sinuses which I had pictured on flexible laryngoscopy.  I placed her on antibiotics and Flonase she has had a remarkable improvement and voice is somewhat back to normal.  Here for recheck  Current Outpatient Medications   Medication Sig Dispense Refill    cefuroxime 250 MG Oral Tab Take 1 tablet (250 mg total) by mouth 2 (two) times daily. 20 tablet 0    fluticasone propionate 50 MCG/ACT Nasal Suspension 2 sprays by Nasal route daily. 16 g 3    predniSONE 5 MG Oral Tab Take 1 tablet (5 mg total) by mouth daily. 180 tablet 0    lidocaine 5 % External Patch Place 2 patches onto the skin daily. 60 each 3    traMADol 50 MG Oral Tab Take 1 tablet (50 mg total) by mouth every 6 (six) hours as needed for Pain. 120 tablet 2    acetaminophen-codeine (TYLENOL WITH CODEINE #3) 300-30 MG Oral Tab Take 1 tablet by mouth 3 (three) times daily as needed for Pain. 90 tablet 3    triamterene-hydroCHLOROthiazide 37.5-25 MG Oral Cap Take 1 capsule by mouth every morning. 30 capsule 5    metoprolol succinate ER 50 MG Oral Tablet 24 Hr Take 1 tablet (50 mg total) by mouth daily.      Solifenacin Succinate 5 MG Oral Tab Take 1 tablet (5 mg total) by mouth daily.      triamcinolone 0.1 % External Ointment Apply a thin layer to affected area on the left elbow once or twice daily for up to 2 weeks // 1 week off, repeat as needed until skin is smooth      cyclobenzaprine 10 MG Oral Tab TAKE 1 TABLET BY MOUTH THREE TIMES DAILY AS NEEDED FOR MUSCLE RELAXER 90 tablet 5    traMADol 50 MG Oral Tab Take 1 tablet (50 mg total) by mouth every 6 (six) hours as needed for Pain. 120 tablet 2    predniSONE 5 MG Oral Tab Take 1 tablet (5 mg total) by mouth daily. 90 tablet 1    lidocaine 5 % External Patch Place 2 patches onto the skin  daily. 60 patch 3    omeprazole 20 MG Oral Capsule Delayed Release Take 1 capsule (20 mg total) by mouth every morning before breakfast.      rosuvastatin 10 MG Oral Tab Take 1 tablet (10 mg total) by mouth nightly.      Adalimumab (HUMIRA PEN) 40 MG/0.4ML Subcutaneous Pen-injector Kit INJECT 40 MG UNDER THE SKIN (SUBCUTANEOUS INJECTION) EVERY 2 WEEKS. Must have yearly office visit for further refills. 1 each 0    diphenoxylate-atropine 2.5-0.025 MG Oral Tab Take 2 tablets by mouth 2 (two) times daily as needed for Diarrhea. 180 tablet 3    clotrimazole 1 % External Cream MISAEL 1 APPLICATION EXT AA BID FOR 30 DAYS      fluconazole 150 MG Oral Tab Take 1 tablet (150 mg total) by mouth.      Fluticasone Propionate 50 MCG/ACT Nasal Suspension 1 sprays per nostril twice daily x at least 2 wks. or until all symptoms are gone      Levocetirizine Dihydrochloride 5 MG Oral Tab Take 1 tablet (5 mg total) by mouth daily.      cyanocobalamin 1000 MCG/ML Injection Solution       escitalopram 20 MG Oral Tab Take 1 tablet (20 mg total) by mouth daily. 90 tablet 2    LORazepam 0.5 MG Oral Tab Take 1 tablet (0.5 mg total) by mouth every 8 (eight) hours as needed for Anxiety. 90 tablet 2    BD INTEGRA SYRINGE 25G X 1\" 3 ML Does not apply Misc INJECT 1 ML INTO THE MUSCLE Q 14 DAYS  0    glimepiride 2 MG Oral Tab TK 1 T PO BID  3    KRISTINA DISP NEEDLES 25G X 1-1/2\" Does not apply Misc INJECT INTO THE SKIN Q 14 DAYS  0    ergocalciferol 09015 UNITS Oral Cap TK 1 C PO TWICE A WEEK  0    Fenofibrate 160 MG Oral Tab TK 1 T PO  Morning  1    Ondansetron HCl (ZOFRAN) 8 MG Oral Tab Take 1 tablet by mouth every 8 (eight) hours as needed for Nausea. 30 tablet 0    Acidophilus/Pectin (PROBIOTIC) Oral Cap Take 1 capsule by mouth as needed.      AmLODIPine Besylate (NORVASC) 5 MG Oral Tab Take 1 tablet (5 mg total) by mouth daily.      albuterol 108 (90 Base) MCG/ACT Inhalation Aero Soln Inhale 2 puffs into the lungs every 6 (six) hours as needed.       metFORMIN HCl  MG Oral Tablet 24 Hr Take 2 tablets (1,000 mg total) by mouth 2 (two) times daily with meals.      Lisinopril 40 MG Oral Tab 1 TABLET DAILY      Montelukast Sodium (SINGULAIR) 10 MG Oral Tab 1 TABLET EVERY EVENING      Multiple Vitamin (DAILY VITAMIN OR) None Entered        Past Medical History:    Anemia    Borderline    Anxiety    Arthritis    COPD (chronic obstructive pulmonary disease) (HCC)    Crohn disease (HCC)    Crohn's disease (HCC)    Depression    Diverticulitis    Esophageal reflux    HIGH BLOOD PRESSURE    HIGH CHOLESTEROL    HPV in female    Kidney cysts    2012    Liver disease    fatty liver    Osteopenia    PONV (postoperative nausea and vomiting)    Sleep apnea    STROKE    TIA    Stroke (HCC)    TIA (transient ischemic attack)    Type II or unspecified type diabetes mellitus without mention of complication, not stated as uncontrolled    Vasculitis (HCC)    Visual impairment    glasses      Social History:  Social History     Socioeconomic History    Marital status:    Tobacco Use    Smoking status: Every Day     Current packs/day: 1.20     Average packs/day: 1.2 packs/day for 40.0 years (48.0 ttl pk-yrs)     Types: Cigarettes    Smokeless tobacco: Never   Vaping Use    Vaping status: Never Used   Substance and Sexual Activity    Alcohol use: Yes     Alcohol/week: 0.0 standard drinks of alcohol     Comment: rare    Drug use: No   Other Topics Concern    Caffeine Concern Yes    Exercise No      Past Surgical History:   Procedure Laterality Date    Appendectomy      Appendectomy            x 1    Cholecystectomy      Colectomy      Colonoscopy  2013    Procedure: COLONOSCOPY;  Surgeon: Sampson Pinto MD;  Location:  ENDOSCOPY    Colonoscopy N/A 7/3/2019    Procedure: COLONOSCOPY;  Surgeon: Sampson Pinto MD;  Location:  ENDOSCOPY    Diagnostic anoscopy  2004    colonoscopy    Eye surgery      Orthopedic surg (pbp)      bilateral  arthroscopy of knees    Tonsillectomy      Total knee replacement      bilateral         REVIEW OF SYSTEMS:   GENERAL HEALTH: feels well otherwise  GENERAL : denies fever, chills, sweats, weight loss, weight gain  SKIN: denies any unusual skin lesions or rashes  RESPIRATORY: denies shortness of breath with exertion  NEURO: denies headaches    EXAM:   There were no vitals taken for this visit.    System Pertinent findings Details   Constitutional  Overall appearance - Normal.   Head/Face  Facial features -- Normal. Skull - Normal.   Eyes  Pupils equal ,round ,react to light and accomidate   Ears  External Ear Right: Normal, Left: Normal. Canal - Right: Normal, Left: Normal. TM - Right: Normal left: Normal   Nose  External Nose, Normal, Septum -midline,Nasal Vault, clear. Turbinates - Right: Normal left: Normal   Mouth/Throat  Lips/teeth/gums - Normal. Tonsils -plus oropharynx - Normal.   Neck Exam  Inspection - Normal. Palpation - Normal. Parotid gland - Normal. Thyroid gland -normal   Lymph Detail  Submental. Submandibular. Anterior cervical. Posterior cervical. Supraclavicular.     Flexible Laryngoscopy Procedure Note (67369)    Due to inability for adequate examination of the larynx and need for magnification to perform the examination, endoscopy was performed.  Risks and benefits were discussed with patient/family and they have given verbal consent to proceed.    Pre-operative Diagnosis:   1. Sinusitis, maxillary, chronic        Post-operative Diagnosis: Same    Procedure: Diagnostic flexible fiberoptic laryngoscopy    Anesthesia: Topical anesthetic [place]     Surgeon Elfego Simpson MD    EBL: 0cc    Procedure Detail & Findings: The patient was topically anesthetized within the nose, bilaterally.  The flexible laryngoscope was placed through the nostrils bilaterally.  The findings within the nose midline.  The findings within the nasopharynx clear.  The findings within the hypopharynx normal.  The findings within  the larynx normal particularly when compared to previous      .    Condition: Stable    Complications: Patient tolerated the procedure well with no immediate complication.    Elfego Rosenbaum MD      ASSESSMENT AND PLAN:   1. Sinusitis, maxillary, chronic  I would continue Flonase and follow-up as needed      The patient indicates understanding of these issues and agrees to the plan.      Elfego Simpson MD  6/12/2024  2:59 PM

## 2024-11-15 DIAGNOSIS — K50.10 CROHN'S DISEASE OF COLON WITHOUT COMPLICATION (HCC): ICD-10-CM

## 2024-11-15 DIAGNOSIS — M51.369 DEGENERATIVE DISC DISEASE, LUMBAR: ICD-10-CM

## 2024-11-15 DIAGNOSIS — K63.9 ARTHRITIS ASSOCIATED WITH INFLAMMATORY BOWEL DISEASE: ICD-10-CM

## 2024-11-15 DIAGNOSIS — M07.60 ARTHRITIS ASSOCIATED WITH INFLAMMATORY BOWEL DISEASE: ICD-10-CM

## 2024-11-15 DIAGNOSIS — M48.061 SPINAL STENOSIS OF LUMBAR REGION WITHOUT NEUROGENIC CLAUDICATION: ICD-10-CM

## 2024-11-15 RX ORDER — ACETAMINOPHEN AND CODEINE PHOSPHATE 300; 30 MG/1; MG/1
1 TABLET ORAL 3 TIMES DAILY PRN
Qty: 90 TABLET | Refills: 0 | Status: SHIPPED | OUTPATIENT
Start: 2024-11-15

## 2024-11-15 RX ORDER — TRAMADOL HYDROCHLORIDE 50 MG/1
50 TABLET ORAL EVERY 6 HOURS PRN
Qty: 120 TABLET | Refills: 0 | Status: SHIPPED | OUTPATIENT
Start: 2024-11-15

## 2024-11-15 NOTE — TELEPHONE ENCOUNTER
Tramadol refill approved.  50 mg 1 every 6 hours #120.  Along with Tylenol 3 refill 1 up to 3 times a day.  Not to take both together.  90 given.  Dr. Cavazos

## 2024-11-15 NOTE — TELEPHONE ENCOUNTER
LOV: 04/25/2024    Future Appointments   Date Time Provider Department Center   4/24/2025 12:00 PM Walter Cavazos MD [facility]       LF PER [place] : TRAMADOL 08/06/2024    QTY: 120    Refills: 0                                   Tylenol #3 08/06/2024  QTY: 90    Refills: 0

## 2025-01-28 NOTE — TELEPHONE ENCOUNTER
Refill request form [facility] for cyclobenzaprine.    Future Appointments   Date Time Provider Vicenta Burnett   3/13/3494  3:02 PM Roddy Sanchez MD [facility] Eduardo Godoy  used

## 2025-04-24 ENCOUNTER — TELEMEDICINE (OUTPATIENT)
Dept: RHEUMATOLOGY | Facility: CLINIC | Age: 75
End: 2025-04-24
Payer: MEDICARE

## 2025-04-24 DIAGNOSIS — K63.9 ARTHRITIS ASSOCIATED WITH INFLAMMATORY BOWEL DISEASE: ICD-10-CM

## 2025-04-24 DIAGNOSIS — M07.60 ARTHRITIS ASSOCIATED WITH INFLAMMATORY BOWEL DISEASE: ICD-10-CM

## 2025-04-24 DIAGNOSIS — M51.369 DEGENERATIVE DISC DISEASE, LUMBAR: ICD-10-CM

## 2025-04-24 DIAGNOSIS — K50.10 CROHN'S DISEASE OF COLON WITHOUT COMPLICATION (HCC): ICD-10-CM

## 2025-04-24 DIAGNOSIS — M48.061 SPINAL STENOSIS OF LUMBAR REGION WITHOUT NEUROGENIC CLAUDICATION: ICD-10-CM

## 2025-04-24 PROCEDURE — 99213 OFFICE O/P EST LOW 20 MIN: CPT | Performed by: INTERNAL MEDICINE

## 2025-04-24 RX ORDER — FLUOCINONIDE 0.5 MG/G
OINTMENT TOPICAL
COMMUNITY
Start: 2024-10-16

## 2025-04-24 RX ORDER — APIXABAN 5 MG/1
5 TABLET, FILM COATED ORAL 2 TIMES DAILY
COMMUNITY
Start: 2025-04-12

## 2025-04-24 RX ORDER — TRAMADOL HYDROCHLORIDE 50 MG/1
50 TABLET ORAL EVERY 6 HOURS PRN
Qty: 120 TABLET | Refills: 2 | Status: SHIPPED | OUTPATIENT
Start: 2025-04-24

## 2025-04-24 RX ORDER — TERCONAZOLE 8 MG/G
1 CREAM VAGINAL NIGHTLY
COMMUNITY
Start: 2025-04-22 | End: 2025-04-25

## 2025-04-24 RX ORDER — LOPERAMIDE HYDROCHLORIDE 2 MG/1
2 CAPSULE ORAL 4 TIMES DAILY PRN
COMMUNITY
Start: 2024-09-26

## 2025-04-24 RX ORDER — LIDOCAINE 50 MG/G
2 PATCH TOPICAL EVERY 24 HOURS
Qty: 60 EACH | Refills: 5 | Status: SHIPPED | OUTPATIENT
Start: 2025-04-24

## 2025-04-24 RX ORDER — ACETAMINOPHEN AND CODEINE PHOSPHATE 300; 30 MG/1; MG/1
1 TABLET ORAL 3 TIMES DAILY PRN
Qty: 90 TABLET | Refills: 1 | Status: SHIPPED | OUTPATIENT
Start: 2025-04-24

## 2025-04-24 RX ORDER — RISANKIZUMAB-RZAA 180 MG/1.2
180 KIT SUBCUTANEOUS
COMMUNITY
Start: 2025-02-21

## 2025-04-24 RX ORDER — FUROSEMIDE 40 MG/1
40 TABLET ORAL DAILY
COMMUNITY
Start: 2025-04-13

## 2025-04-24 RX ORDER — PREDNISONE 5 MG/1
5 TABLET ORAL DAILY
Qty: 90 TABLET | Refills: 1 | Status: SHIPPED | OUTPATIENT
Start: 2025-04-24

## 2025-04-24 NOTE — PROGRESS NOTES
Telephone appointment - audio only  Office visit canceled due to fatigue.    Mrs. Néstor Plaza consents to a virtual/telephone check in service on 4/24/25..  Patient understands and accepts financial responsibility for any deductible, coinsurance and/or co-pays associated with the service.  Duration of the service: 20 minutes    Summary of topics discussed. Arthritis. Crohn's disease.   [facility]  4/24/2025 [facility]  Dr. Cavazos Progress Note  Subjective:   Néstor Plaza is a(n) 75 year old female.   Current complaints:   Chief Complaint   Patient presents with    Follow - Up     LOV 4/25/2024   Chronic lumbar spinal stenosis, DDD lumbar spine, diabetes. And Crohn's disease.  On Skyrizi for Crohn's.  .Crohn's ok.  No severe pain today.    Arthritis pain off and on.  On Prednisone 5 mg per day.    Lost  36 pounds.  Recent hospital visit for possible lung infections, treated with antibiotics,   Objective:   Telephone visit  20 minutes duration in total.   4/13/25 portable chest x-ray from [facility] showed persistent mid and infrahilar groundglass attenuation in both lungs.  This may be pulmonary edema and/or atelectasis and layering pleural effusions.  3/17/25  Hb A1C - 6,4    Assessment:     Encounter Diagnoses   Name Primary?    Spinal stenosis of lumbar region without neurogenic claudication     Degenerative disc disease, lumbar     Arthritis associated with inflammatory bowel disease     Crohn's disease of colon without complication (HCC)      Plan:     Patient Instructions   Continue Skyrizi as ordered by your Crohn's doctor.  From very mild pain take extra strength Tylenol 500 mg 1 or 2 a day.  For moderate pain during the day take tramadol 50 mg 1 or 2.  For severe pain at night take Tylenol 3 1 or 2 at night.  Prednisone 5 mg taken 1 a day for control of arthritis pain.  If you have a flareup you might have to take 2 prednisone tablets which are each equal 10 mg.  Use Lidoderm patch on areas  of pain 1 a day.  12 hours on 12 hours off.  Pace your activity.  Return to office for recheck 6 months.        Walter Cavazos MD 4/24/2025 12:20 PM

## 2025-04-24 NOTE — PATIENT INSTRUCTIONS
Continue Skyrizi as ordered by your Crohn's doctor.  From very mild pain take extra strength Tylenol 500 mg 1 or 2 a day.  For moderate pain during the day take tramadol 50 mg 1 or 2.  For severe pain at night take Tylenol 3 1 or 2 at night.  Prednisone 5 mg taken 1 a day for control of arthritis pain.  If you have a flareup you might have to take 2 prednisone tablets which are each equal 10 mg.  Use Lidoderm patch on areas of pain 1 a day.  12 hours on 12 hours off.  Pace your activity.  Return to office for recheck 6 months.

## (undated) DEVICE — MARKER SKIN 2 TIP

## (undated) DEVICE — AVANOS* TUOHY EPIDURAL NEEDLE: Brand: AVANOS

## (undated) DEVICE — Device

## (undated) DEVICE — GLOVE SURG SENSICARE SZ 6-1/2

## (undated) DEVICE — REMOVER DURAPREP 3M

## (undated) DEVICE — FILTERLINE NASAL ADULT O2/CO2

## (undated) DEVICE — PAIN TRAY: Brand: MEDLINE INDUSTRIES, INC.

## (undated) DEVICE — 3M™ RED DOT™ MONITORING ELECTRODE WITH FOAM TAPE AND STICKY GEL, 50/BAG, 20/CASE, 72/PLT 2570: Brand: RED DOT™

## (undated) DEVICE — 1200CC GUARDIAN II: Brand: GUARDIAN

## (undated) DEVICE — GLOVE SURG SENSICARE SZ 7

## (undated) DEVICE — ENDOSCOPY PACK - LOWER: Brand: MEDLINE INDUSTRIES, INC.

## (undated) DEVICE — GLOVE SURG SENSICARE SZ 7-1/2

## (undated) DEVICE — BANDAID COVERLET 1X3

## (undated) DEVICE — CHLORAPREP ORANGE TINT 10.5ML

## (undated) DEVICE — Device: Brand: DEFENDO AIR/WATER/SUCTION AND BIOPSY VALVE

## (undated) DEVICE — FORCEP BIOPSY RJ4 LG CAP W/ND

## (undated) NOTE — ED AVS SNAPSHOT
[facility] Emergency Department    [place]  [address] [address]    Phone:  [phone]    Fax:  [address]   MRN: [id number]    Department:  [facility] Emergency Department   Date of Visit:  3/10/ IF THERE IS ANY CHANGE OR WORSENING OF YOUR CONDITION, CALL YOUR PRIMARY CARE PHYSICIAN AT ONCE OR RETURN IMMEDIATELY TO THE EMERGENCY DEPARTMENT.     If you have been prescribed any medication(s), please fill your prescription right away and begin taking t

## (undated) NOTE — LETTER
7/12/2019          Tano Mendez [facility] [address]    Dear Mc Mak,       Here are the  biopsy/pathology findings from your recent Colonoscopy :    a normal biopsy.       Follow-up information:    Biopsies ok repeat colonosco

## (undated) NOTE — ED AVS SNAPSHOT
[facility] Emergency Department    [place]  [address] [address]    Phone:  [phone]    Fax:  [address]   MRN: [id number]    Department:  [facility] Emergency Department   Date of Visit:  3/10/ You should not take this drug for forty-eight (48) hours after this Imaging study. DO NOT begin taking this drug again until you have contacted the physician who ordered this medication.     Your physician may need to evaluate your kidney function before y primary care or specialist physician will see patients referred from the [facility] Emergency Department. Follow-up care is at the discretion of that Physician.     IF THERE IS ANY CHANGE OR WORSENING OF YOUR CONDITION, CALL YOUR PRIMARY CARE PHYSICIAN harming yourself, contact [facility] at [phone]. - If you don’t have insurance, Mary Kate Contreras has partnered with [facility] [address] to help you get signed up for insurance coverage.   [facility] [place] LUNGS:  There is worsening of mild atelectasis/scarring throughout the left lung. There is stable mild scarring/atelectasis in the right lower lobe.   VASCULATURE:  There is enlargement of the pulmonary arterial trunk measuring approximately 4.4 cm in diame

## (undated) NOTE — MR AVS SNAPSHOT
[facility]  [address] [facility],[address], [address]  [address] [facility] [address] [phone]               Thank you for choosing us for your health care visit with Zuly Borja MD.  We are glad to serve you and happy to provide you with [facility] Unknown reaction    Keflex [Cephalexin] Hives    Levaquin [Levofloxacin] Nausea and vomiting    Oxycodone     Severe vomiting    Penicillins     Itchy throat    Radiology Contrast Iodinated Dyes Hives    Hives even after being medicated    Sulfa Antibioti Fenofibrate 160 MG Tabs   TK 1 T PO  NIGHTLY           GLUCOPHAGE  MG Tb24   Generic drug:  MetFORMIN HCl ER   Take 500 mg by mouth daily with breakfast.           lidocaine 5 % Ptch   Place 2 patches onto the skin daily.    Commonly known as:  LIDOD You can get these medications from any pharmacy     Bring a paper prescription for each of these medications    - Acetaminophen-Codeine 300-30 MG Tabs  - TraMADol HCl 50 MG Tabs            Medications Administered in the Office Today     methylPREDNISolone Start activities slowly and build up over time Do what you like   Get your heart pumping – brisk walking, biking, swimming Even 10 minute increments are effective and add up over the week   2 ½ hours per week – spread out over time Use a hugh to keep you

## (undated) NOTE — ED AVS SNAPSHOT
[facility] Emergency Department    [place]  [address] [address]    Phone:  [phone]    Fax:  [address]   MRN: [id number]    Department:  [facility] Emergency Department   Date of Visit:  3/16/ IF THERE IS ANY CHANGE OR WORSENING OF YOUR CONDITION, CALL YOUR PRIMARY CARE PHYSICIAN AT ONCE OR RETURN IMMEDIATELY TO THE EMERGENCY DEPARTMENT.     If you have been prescribed any medication(s), please fill your prescription right away and begin taking t

## (undated) NOTE — ED AVS SNAPSHOT
[facility] Emergency Department    [place]  [address] [address]    Phone:  [phone]    Fax:  [address]   MRN: [id number]    Department:  [facility] Emergency Department   Date of Visit:  3/16/ side effects. Medication List      Notice     You have not been prescribed any medications.             Discharge References/Attachments     RIB FRACTURE (ENGLISH)    RIB CONTUSION OR MINOR FRACTURE (ENGLISH)    SMOKING CESSATION (ENGLISH) IF THERE IS ANY CHANGE OR WORSENING OF YOUR CONDITION, CALL YOUR PRIMARY CARE PHYSICIAN AT ONCE OR RETURN IMMEDIATELY TO THE EMERGENCY DEPARTMENT.     If you have been prescribed any medication(s), please fill your prescription right away and begin taking t [facility] [address] to help you get signed up for insurance coverage. [facility] [address] is a Federal Navigator program that can help with your Affordable Care Act coverage, as well as all types of Medicaid plans.   To get signed up and covere

## (undated) NOTE — LETTER
Malk[address] Meng [address] [address]  [place], [address] [facility]    Consent for Operation  Date: __________________                                Time: _______________    1.  I authorize the performance upon Jenna Dowd the following operation:  Procedure( videotape. [facility] will not be responsible for storage or maintenance of this tape. 7. For the purpose of advancing medical education, I consent to the admittance of observers to the Operating Room.   8. I authorize the use of any specimen, organs, ti When the patient is a minor or mentally incompetent to give consent:  Signature of person authorized to consent for patient: ___________________________   Relationship to patient: ____________________________________________________    Signature of Witness these medicines may increase my risk of anesthetic complications. iv. If I am allergic to anything or have had a reaction to anesthesia before. 3. I understand how the anesthesia medicine will help me (benefits).   4. I understand that with any type of an patient’s representative) and answered their questions. The patient or their representative has agreed to have anesthesia services.     _____________________________________________________________________________  Witness        Date   Time  I have tanika